# Patient Record
Sex: FEMALE | Race: WHITE | Employment: UNEMPLOYED | URBAN - METROPOLITAN AREA
[De-identification: names, ages, dates, MRNs, and addresses within clinical notes are randomized per-mention and may not be internally consistent; named-entity substitution may affect disease eponyms.]

---

## 2023-01-30 PROBLEM — L30.4 INTERTRIGO: Status: ACTIVE | Noted: 2023-01-30

## 2023-01-30 PROBLEM — G43.909 MIGRAINES: Status: ACTIVE | Noted: 2023-01-30

## 2023-01-30 PROBLEM — R19.7 DIARRHEA: Status: ACTIVE | Noted: 2023-01-30

## 2023-01-30 PROBLEM — F12.90 MARIJUANA USE: Status: ACTIVE | Noted: 2023-01-30

## 2023-01-30 PROBLEM — R73.9 HYPERGLYCEMIA: Status: ACTIVE | Noted: 2023-01-30

## 2023-01-30 PROBLEM — K21.9 GERD (GASTROESOPHAGEAL REFLUX DISEASE): Status: ACTIVE | Noted: 2023-01-30

## 2023-01-30 PROBLEM — E55.9 VITAMIN D DEFICIENCY: Status: ACTIVE | Noted: 2023-01-30

## 2023-01-30 PROBLEM — F32.2 SEVERE MAJOR DEPRESSION (MULTI): Status: ACTIVE | Noted: 2023-01-30

## 2023-01-30 PROBLEM — K31.84 GASTROPARESIS: Status: ACTIVE | Noted: 2023-01-30

## 2023-01-30 PROBLEM — R11.0 NAUSEA IN ADULT: Status: ACTIVE | Noted: 2023-01-30

## 2023-01-30 PROBLEM — E03.9 HYPOTHYROIDISM: Status: ACTIVE | Noted: 2023-01-30
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
## 2023-01-30 PROBLEM — F41.9 SEVERE ANXIETY: Status: ACTIVE | Noted: 2023-01-30

## 2023-01-30 PROBLEM — K31.9 STOMACH PROBLEMS: Status: ACTIVE | Noted: 2023-01-30

## 2023-01-30 PROBLEM — F17.210 CIGARETTE NICOTINE DEPENDENCE WITHOUT COMPLICATION: Status: ACTIVE | Noted: 2023-01-30

## 2023-01-30 PROBLEM — D80.2 IGA DEFICIENCY (MULTI): Status: ACTIVE | Noted: 2023-01-30

## 2023-01-30 PROBLEM — G47.00 INSOMNIA: Status: ACTIVE | Noted: 2023-01-30

## 2023-01-30 PROBLEM — J30.2 SEASONAL ALLERGIES: Status: ACTIVE | Noted: 2023-01-30

## 2023-01-30 PROBLEM — F43.10 PTSD (POST-TRAUMATIC STRESS DISORDER): Status: ACTIVE | Noted: 2023-01-30

## 2023-01-30 PROBLEM — S61.519A SELF-CUTTING OF WRIST (MULTI): Status: ACTIVE | Noted: 2023-01-30

## 2023-01-30 PROBLEM — E78.5 HYPERLIPIDEMIA: Status: RESOLVED | Noted: 2023-01-30 | Resolved: 2023-01-30

## 2023-01-30 RX ORDER — VARENICLINE TARTRATE 1 MG/1
1 TABLET, FILM COATED ORAL 2 TIMES DAILY
COMMUNITY
End: 2024-01-16 | Stop reason: WASHOUT

## 2023-01-30 RX ORDER — QUETIAPINE FUMARATE 200 MG/1
1 TABLET, FILM COATED ORAL DAILY
COMMUNITY
Start: 2021-06-17 | End: 2023-03-14

## 2023-01-30 RX ORDER — TRAZODONE HYDROCHLORIDE 150 MG/1
1 TABLET ORAL NIGHTLY
COMMUNITY
Start: 2021-06-17 | End: 2023-03-14

## 2023-01-30 RX ORDER — DULOXETIN HYDROCHLORIDE 60 MG/1
2 CAPSULE, DELAYED RELEASE ORAL NIGHTLY
COMMUNITY
Start: 2021-06-17 | End: 2023-03-14 | Stop reason: SDUPTHER

## 2023-01-30 RX ORDER — LEVOTHYROXINE SODIUM 100 UG/1
1 TABLET ORAL DAILY
COMMUNITY
Start: 2021-06-17 | End: 2023-03-14 | Stop reason: SDUPTHER

## 2023-01-30 RX ORDER — HYOSCYAMINE SULFATE 0.125 MG
0.12 TABLET ORAL
COMMUNITY
Start: 2021-08-11 | End: 2023-03-14 | Stop reason: SDUPTHER

## 2023-01-30 RX ORDER — ACETAMINOPHEN 500 MG
1 TABLET ORAL DAILY
COMMUNITY
Start: 2022-09-15 | End: 2023-03-14

## 2023-01-30 RX ORDER — FLUTICASONE PROPIONATE 50 MCG
1 SPRAY, SUSPENSION (ML) NASAL DAILY
COMMUNITY
Start: 2022-04-19 | End: 2023-03-14

## 2023-01-30 RX ORDER — OMEPRAZOLE 20 MG/1
20 CAPSULE, DELAYED RELEASE ORAL
COMMUNITY
Start: 2022-01-25 | End: 2023-03-14 | Stop reason: SDUPTHER

## 2023-01-30 RX ORDER — METOCLOPRAMIDE 5 MG/1
5 TABLET ORAL 4 TIMES DAILY
COMMUNITY
End: 2023-03-14 | Stop reason: SDUPTHER

## 2023-01-30 RX ORDER — CETIRIZINE HYDROCHLORIDE 10 MG/1
1 TABLET ORAL DAILY
COMMUNITY
Start: 2022-04-19 | End: 2023-03-14 | Stop reason: SDUPTHER

## 2023-02-07 PROBLEM — R69 DIAGNOSIS UNKNOWN: Status: ACTIVE | Noted: 2023-02-07

## 2023-03-14 ENCOUNTER — OFFICE VISIT (OUTPATIENT)
Dept: PRIMARY CARE | Facility: CLINIC | Age: 23
End: 2023-03-14
Payer: COMMERCIAL

## 2023-03-14 VITALS
HEART RATE: 94 BPM | DIASTOLIC BLOOD PRESSURE: 90 MMHG | WEIGHT: 293 LBS | BODY MASS INDEX: 44.41 KG/M2 | TEMPERATURE: 98.6 F | HEIGHT: 68 IN | OXYGEN SATURATION: 96 % | SYSTOLIC BLOOD PRESSURE: 122 MMHG | RESPIRATION RATE: 16 BRPM

## 2023-03-14 DIAGNOSIS — K21.9 GASTROESOPHAGEAL REFLUX DISEASE WITHOUT ESOPHAGITIS: ICD-10-CM

## 2023-03-14 DIAGNOSIS — K31.84 GASTROPARESIS: ICD-10-CM

## 2023-03-14 DIAGNOSIS — D80.2 IGA DEFICIENCY (MULTI): ICD-10-CM

## 2023-03-14 DIAGNOSIS — F32.2 SEVERE MAJOR DEPRESSION (MULTI): ICD-10-CM

## 2023-03-14 DIAGNOSIS — I10 ESSENTIAL HYPERTENSION: Primary | ICD-10-CM

## 2023-03-14 DIAGNOSIS — E03.9 ACQUIRED HYPOTHYROIDISM: ICD-10-CM

## 2023-03-14 DIAGNOSIS — E66.01 CLASS 3 SEVERE OBESITY DUE TO EXCESS CALORIES WITHOUT SERIOUS COMORBIDITY WITH BODY MASS INDEX (BMI) OF 50.0 TO 59.9 IN ADULT (MULTI): ICD-10-CM

## 2023-03-14 DIAGNOSIS — J30.2 SEASONAL ALLERGIES: ICD-10-CM

## 2023-03-14 DIAGNOSIS — F41.9 SEVERE ANXIETY: ICD-10-CM

## 2023-03-14 DIAGNOSIS — K58.0 IRRITABLE BOWEL SYNDROME WITH DIARRHEA: ICD-10-CM

## 2023-03-14 DIAGNOSIS — L30.4 INTERTRIGO: ICD-10-CM

## 2023-03-14 DIAGNOSIS — F43.10 PTSD (POST-TRAUMATIC STRESS DISORDER): ICD-10-CM

## 2023-03-14 DIAGNOSIS — R73.9 HYPERGLYCEMIA: ICD-10-CM

## 2023-03-14 PROBLEM — F51.04 PSYCHOPHYSIOLOGICAL INSOMNIA: Status: ACTIVE | Noted: 2023-01-30

## 2023-03-14 PROBLEM — R69 DIAGNOSIS UNKNOWN: Status: RESOLVED | Noted: 2023-02-07 | Resolved: 2023-03-14

## 2023-03-14 PROBLEM — R19.7 DIARRHEA: Status: RESOLVED | Noted: 2023-01-30 | Resolved: 2023-03-14

## 2023-03-14 PROBLEM — E66.813 CLASS 3 SEVERE OBESITY DUE TO EXCESS CALORIES WITHOUT SERIOUS COMORBIDITY WITH BODY MASS INDEX (BMI) OF 50.0 TO 59.9 IN ADULT: Status: ACTIVE | Noted: 2023-03-14

## 2023-03-14 LAB
POC FINGERSTICK BLOOD GLUCOSE: 119 MG/DL (ref 70–100)
POC HEMOGLOBIN A1C: 5.5 % (ref 4.2–6.5)

## 2023-03-14 PROCEDURE — 3008F BODY MASS INDEX DOCD: CPT | Performed by: FAMILY MEDICINE

## 2023-03-14 PROCEDURE — 3074F SYST BP LT 130 MM HG: CPT | Performed by: FAMILY MEDICINE

## 2023-03-14 PROCEDURE — 82962 GLUCOSE BLOOD TEST: CPT | Performed by: FAMILY MEDICINE

## 2023-03-14 PROCEDURE — 99214 OFFICE O/P EST MOD 30 MIN: CPT | Performed by: FAMILY MEDICINE

## 2023-03-14 PROCEDURE — 83036 HEMOGLOBIN GLYCOSYLATED A1C: CPT | Performed by: FAMILY MEDICINE

## 2023-03-14 PROCEDURE — 3080F DIAST BP >= 90 MM HG: CPT | Performed by: FAMILY MEDICINE

## 2023-03-14 RX ORDER — FAMOTIDINE 20 MG/1
20 TABLET, FILM COATED ORAL DAILY
Start: 2023-03-14 | End: 2024-01-16 | Stop reason: WASHOUT

## 2023-03-14 RX ORDER — HYDROXYZINE PAMOATE 25 MG/1
25 CAPSULE ORAL 4 TIMES DAILY PRN
Qty: 30 CAPSULE
Start: 2023-03-14

## 2023-03-14 RX ORDER — CETIRIZINE HYDROCHLORIDE 10 MG/1
10 TABLET ORAL DAILY
Start: 2023-03-14 | End: 2024-01-16 | Stop reason: SDUPTHER

## 2023-03-14 RX ORDER — OMEPRAZOLE 20 MG/1
20 CAPSULE, DELAYED RELEASE ORAL
Start: 2023-03-14 | End: 2024-01-16 | Stop reason: SDUPTHER

## 2023-03-14 RX ORDER — DULOXETIN HYDROCHLORIDE 60 MG/1
120 CAPSULE, DELAYED RELEASE ORAL NIGHTLY
Start: 2023-03-14 | End: 2024-01-16 | Stop reason: SDUPTHER

## 2023-03-14 RX ORDER — ONDANSETRON 4 MG/1
4 TABLET, ORALLY DISINTEGRATING ORAL EVERY 8 HOURS PRN
Qty: 20 TABLET
Start: 2023-03-14

## 2023-03-14 RX ORDER — FAMOTIDINE 20 MG/1
1 TABLET, FILM COATED ORAL DAILY
COMMUNITY
Start: 2022-08-31 | End: 2023-03-14 | Stop reason: DRUGHIGH

## 2023-03-14 RX ORDER — METOCLOPRAMIDE 5 MG/1
5 TABLET ORAL 4 TIMES DAILY
Start: 2023-03-14 | End: 2024-01-16 | Stop reason: SDUPTHER

## 2023-03-14 RX ORDER — HYDROXYZINE PAMOATE 25 MG/1
CAPSULE ORAL
COMMUNITY
Start: 2023-01-11 | End: 2023-03-14 | Stop reason: SDUPTHER

## 2023-03-14 RX ORDER — ONDANSETRON 4 MG/1
TABLET, ORALLY DISINTEGRATING ORAL
COMMUNITY
Start: 2020-02-06 | End: 2023-03-14 | Stop reason: SDUPTHER

## 2023-03-14 RX ORDER — KETOCONAZOLE 20 MG/G
CREAM TOPICAL 2 TIMES DAILY
COMMUNITY
Start: 2022-10-11 | End: 2023-03-14 | Stop reason: SDUPTHER

## 2023-03-14 RX ORDER — HYOSCYAMINE SULFATE 0.12 MG/1
TABLET, ORALLY DISINTEGRATING ORAL
COMMUNITY
Start: 2020-02-07 | End: 2023-03-14 | Stop reason: SDUPTHER

## 2023-03-14 RX ORDER — LEVOTHYROXINE SODIUM 100 UG/1
100 TABLET ORAL
Start: 2023-03-14 | End: 2023-05-08

## 2023-03-14 RX ORDER — FAMOTIDINE 20 MG/1
20 TABLET, FILM COATED ORAL DAILY
Status: SHIPPED
Start: 2023-03-14 | End: 2023-03-14 | Stop reason: SDUPTHER

## 2023-03-14 RX ORDER — LISINOPRIL 10 MG/1
10 TABLET ORAL DAILY
Qty: 30 TABLET | Refills: 5 | Status: SHIPPED | OUTPATIENT
Start: 2023-03-14 | End: 2023-03-29 | Stop reason: SDUPTHER

## 2023-03-14 RX ORDER — HYOSCYAMINE SULFATE 0.12 MG/1
0.12 TABLET, ORALLY DISINTEGRATING ORAL 4 TIMES DAILY PRN
Qty: 360 TABLET | Refills: 1
Start: 2023-03-14 | End: 2023-12-14 | Stop reason: SDUPTHER

## 2023-03-14 RX ORDER — KETOCONAZOLE 20 MG/G
CREAM TOPICAL 2 TIMES DAILY
Start: 2023-03-14 | End: 2024-01-16 | Stop reason: WASHOUT

## 2023-03-14 ASSESSMENT — ENCOUNTER SYMPTOMS
POLYDIPSIA: 0
NUMBNESS: 0
CONFUSION: 0
COUGH: 0
DYSPHORIC MOOD: 0
LIGHT-HEADEDNESS: 0
DIZZINESS: 0
ABDOMINAL PAIN: 0
SINUS PAIN: 0
SHORTNESS OF BREATH: 0
HEMATURIA: 0
NAUSEA: 0
HEADACHES: 0
POLYPHAGIA: 0
ADENOPATHY: 0
CHILLS: 0
WHEEZING: 0
NERVOUS/ANXIOUS: 0
DYSURIA: 0
FEVER: 0
DIARRHEA: 0
CONSTIPATION: 0
DIAPHORESIS: 0
FREQUENCY: 0
SORE THROAT: 0
VOMITING: 0
CHEST TIGHTNESS: 0
UNEXPECTED WEIGHT CHANGE: 0
SINUS PRESSURE: 0
PALPITATIONS: 0

## 2023-03-14 ASSESSMENT — PATIENT HEALTH QUESTIONNAIRE - PHQ9
6. FEELING BAD ABOUT YOURSELF - OR THAT YOU ARE A FAILURE OR HAVE LET YOURSELF OR YOUR FAMILY DOWN: NEARLY EVERY DAY
3. TROUBLE FALLING OR STAYING ASLEEP OR SLEEPING TOO MUCH: MORE THAN HALF THE DAYS
5. POOR APPETITE OR OVEREATING: NEARLY EVERY DAY
4. FEELING TIRED OR HAVING LITTLE ENERGY: NEARLY EVERY DAY
10. IF YOU CHECKED OFF ANY PROBLEMS, HOW DIFFICULT HAVE THESE PROBLEMS MADE IT FOR YOU TO DO YOUR WORK, TAKE CARE OF THINGS AT HOME, OR GET ALONG WITH OTHER PEOPLE: EXTREMELY DIFFICULT
SUM OF ALL RESPONSES TO PHQ QUESTIONS 1-9: 24
SUM OF ALL RESPONSES TO PHQ9 QUESTIONS 1 AND 2: 6
2. FEELING DOWN, DEPRESSED OR HOPELESS: NEARLY EVERY DAY
1. LITTLE INTEREST OR PLEASURE IN DOING THINGS: NEARLY EVERY DAY
7. TROUBLE CONCENTRATING ON THINGS, SUCH AS READING THE NEWSPAPER OR WATCHING TELEVISION: NEARLY EVERY DAY
9. THOUGHTS THAT YOU WOULD BE BETTER OFF DEAD, OR OF HURTING YOURSELF: NEARLY EVERY DAY
8. MOVING OR SPEAKING SO SLOWLY THAT OTHER PEOPLE COULD HAVE NOTICED. OR THE OPPOSITE, BEING SO FIGETY OR RESTLESS THAT YOU HAVE BEEN MOVING AROUND A LOT MORE THAN USUAL: SEVERAL DAYS

## 2023-03-14 ASSESSMENT — ANXIETY QUESTIONNAIRES
2. NOT BEING ABLE TO STOP OR CONTROL WORRYING: NEARLY EVERY DAY
4. TROUBLE RELAXING: SEVERAL DAYS
5. BEING SO RESTLESS THAT IT IS HARD TO SIT STILL: NEARLY EVERY DAY
1. FEELING NERVOUS, ANXIOUS, OR ON EDGE: NEARLY EVERY DAY
3. WORRYING TOO MUCH ABOUT DIFFERENT THINGS: NEARLY EVERY DAY
IF YOU CHECKED OFF ANY PROBLEMS ON THIS QUESTIONNAIRE, HOW DIFFICULT HAVE THESE PROBLEMS MADE IT FOR YOU TO DO YOUR WORK, TAKE CARE OF THINGS AT HOME, OR GET ALONG WITH OTHER PEOPLE: EXTREMELY DIFFICULT
7. FEELING AFRAID AS IF SOMETHING AWFUL MIGHT HAPPEN: NEARLY EVERY DAY
6. BECOMING EASILY ANNOYED OR IRRITABLE: NEARLY EVERY DAY
GAD7 TOTAL SCORE: 19

## 2023-03-14 NOTE — ASSESSMENT & PLAN NOTE
Encouraged healthy lifestyle, including adequate exercise and high fiber, low fat and low carb diet.

## 2023-03-14 NOTE — PATIENT INSTRUCTIONS
Isabella Tran ,    Thank you for coming in today. We at [facility] appreciate your trust in our care. If you have any questions or concerns about the care you received today, please do not hesitate to contact us at [phone].    The following instructions were discussed today:    - start lisinopril 10 mg daily   - get labs  - For blood work: Nothing to eat or drink for at least 10 hours prior. Okay for water or black coffee.   - Follow up in 2 weeks for nurse visit for blood pressure   - Follow up in 3 months.

## 2023-03-14 NOTE — PROGRESS NOTES
"Subjective   Patient ID: Isabella Tran is a 22 y.o. adult who presents for bp and sugar concerns.    routine follow up. chronic issues as per assessment and plan.     Concerned about blood pressure. Has been monitoring at home with a wrist cuff --> running 150s to 160s over 90s to low 100s. Today initial blood pressure 148/96 and on recheck 122/90. Denies chest pain and shortness of breath.     His mom is also concerned about his blood sugar. Diabetes does run in the family. Blood sugar today was 119. Had [employer]a-[employer]a about an hour prior. HbA1c today is 5.5.         Review of Systems   Constitutional:  Negative for chills, diaphoresis, fever and unexpected weight change.   HENT:  Negative for congestion, sinus pressure, sinus pain, sneezing and sore throat.    Respiratory:  Negative for cough, chest tightness, shortness of breath and wheezing.    Cardiovascular:  Negative for chest pain, palpitations and leg swelling.   Gastrointestinal:  Negative for abdominal pain, constipation, diarrhea, nausea and vomiting.   Endocrine: Negative for cold intolerance, heat intolerance, polydipsia, polyphagia and polyuria.   Genitourinary:  Negative for dysuria, frequency, hematuria and urgency.   Neurological:  Negative for dizziness, syncope, light-headedness, numbness and headaches.   Hematological:  Negative for adenopathy.   Psychiatric/Behavioral:  Negative for confusion and dysphoric mood. The patient is not nervous/anxious.        Objective   /90   Pulse 94   Temp 37 °C (98.6 °F) (Oral)   Resp 16   Ht 1.727 m (5' 8\")   Wt (!) 168 kg (371 lb)   SpO2 96%   BMI 56.41 kg/m²     Physical Exam  Vitals and nursing note reviewed.   Constitutional:       General: He is not in acute distress.     Appearance: Normal appearance.   HENT:      Head: Normocephalic and atraumatic.      Nose: Nose normal.   Eyes:      Extraocular Movements: Extraocular movements intact.      Conjunctiva/sclera: Conjunctivae normal.      " Pupils: Pupils are equal, round, and reactive to light.   Cardiovascular:      Rate and Rhythm: Normal rate and regular rhythm.      Heart sounds: No murmur heard.     No friction rub. No gallop.   Pulmonary:      Effort: Pulmonary effort is normal.      Breath sounds: Normal breath sounds. No wheezing, rhonchi or rales.   Abdominal:      General: Bowel sounds are normal. There is no distension.      Palpations: Abdomen is soft.      Tenderness: There is no abdominal tenderness.   Musculoskeletal:         General: Normal range of motion.      Cervical back: Normal range of motion and neck supple.   Skin:     General: Skin is warm and dry.   Neurological:      General: No focal deficit present.      Mental Status: He is alert and oriented to person, place, and time.      Deep Tendon Reflexes: Reflexes normal.   Psychiatric:         Mood and Affect: Mood normal.         Behavior: Behavior normal.         Thought Content: Thought content normal.         Judgment: Judgment normal.       Lab Results   Component Value Date    HGBA1C 5.5 03/14/2023        Assessment/Plan   Problem List Items Addressed This Visit       BMI 50.0-59.9, adult (CMS/[facility])     Encouraged healthy lifestyle, including adequate exercise and high fiber, low fat and low carb diet.          Class 3 severe obesity due to excess calories without serious comorbidity with body mass index (BMI) of 50.0 to 59.9 in adult (CMS/[facility])     Encouraged healthy lifestyle, including adequate exercise and high fiber, low fat and low carb diet.          Relevant Orders    CBC and Auto Differential    Comprehensive Metabolic Panel    Lipid Panel    TSH with reflex to Free T4 if abnormal    Vitamin B12    Vitamin D 1,25 Dihydroxy    Essential hypertension - Primary    Relevant Medications    lisinopril 10 mg tablet    Gastroesophageal reflux disease without esophagitis     - stable. Continue omeprazole and pepcid         Relevant Medications    famotidine (Pepcid) 20 mg  tablet    omeprazole (PriLOSEC) 20 mg DR capsule    ondansetron ODT (Zofran-ODT) 4 mg disintegrating tablet    Other Relevant Orders    CBC and Auto Differential    Comprehensive Metabolic Panel    Lipid Panel    TSH with reflex to Free T4 if abnormal    Vitamin B12    Vitamin D 1,25 Dihydroxy    Gastroparesis     - stable. Continue metoclopramide          Relevant Medications    metoclopramide (Reglan) 5 mg tablet    ondansetron ODT (Zofran-ODT) 4 mg disintegrating tablet    Other Relevant Orders    CBC and Auto Differential    Comprehensive Metabolic Panel    Lipid Panel    TSH with reflex to Free T4 if abnormal    Vitamin B12    Vitamin D 1,25 Dihydroxy    Hyperglycemia     - blood sugar today 119  - HbA1c today 5.5, so no diabetes  - Encouraged healthy lifestyle, including adequate exercise and high fiber, low fat and low carb diet.          Relevant Orders    POCT fingerstick glucose manually resulted (Completed)    CBC and Auto Differential    Comprehensive Metabolic Panel    Lipid Panel    TSH with reflex to Free T4 if abnormal    Vitamin B12    Vitamin D 1,25 Dihydroxy    POCT glycosylated hemoglobin (Hb A1C) manually resulted (Completed)    Hypothyroidism     - continue levothyroxine  - check labs          Relevant Medications    levothyroxine (Synthroid, Levoxyl) 100 mcg tablet    Other Relevant Orders    CBC and Auto Differential    Comprehensive Metabolic Panel    Lipid Panel    TSH with reflex to Free T4 if abnormal    Vitamin B12    Vitamin D 1,25 Dihydroxy    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  - primarily manifests as stomach issues  -monitor         Intertrigo    Relevant Medications    ketoconazole (NIZOral) 2 % cream    Other Relevant Orders    CBC and Auto Differential    Comprehensive Metabolic Panel    Lipid Panel    TSH with reflex to Free T4 if abnormal    Vitamin B12    Vitamin D 1,25 Dihydroxy    Irritable bowel syndrome with diarrhea     - continue levsin         Relevant  Medications    hyoscyamine (Anaspaz) 0.125 mg disintegrating tablet    ondansetron ODT (Zofran-ODT) 4 mg disintegrating tablet    Other Relevant Orders    CBC and Auto Differential    Comprehensive Metabolic Panel    Lipid Panel    TSH with reflex to Free T4 if abnormal    Vitamin B12    Vitamin D 1,25 Dihydroxy    PTSD (post-traumatic stress disorder)     - follows with psychiatry          Relevant Medications    DULoxetine (Cymbalta) 60 mg DR capsule    hydrOXYzine pamoate (Vistaril) 25 mg capsule    Other Relevant Orders    CBC and Auto Differential    Comprehensive Metabolic Panel    Lipid Panel    TSH with reflex to Free T4 if abnormal    Vitamin B12    Vitamin D 1,25 Dihydroxy    Seasonal allergies     - controlled. Continue cetirizine         Relevant Medications    cetirizine (ZyrTEC) 10 mg tablet    Other Relevant Orders    CBC and Auto Differential    Comprehensive Metabolic Panel    Lipid Panel    TSH with reflex to Free T4 if abnormal    Vitamin B12    Vitamin D 1,25 Dihydroxy    Severe anxiety     - follows with psychiatry          Relevant Medications    DULoxetine (Cymbalta) 60 mg DR capsule    hydrOXYzine pamoate (Vistaril) 25 mg capsule    Other Relevant Orders    CBC and Auto Differential    Comprehensive Metabolic Panel    Lipid Panel    TSH with reflex to Free T4 if abnormal    Vitamin B12    Vitamin D 1,25 Dihydroxy    Severe major depression (CMS/HCC)     - follows with psychiatry         Relevant Medications    DULoxetine (Cymbalta) 60 mg DR capsule    hydrOXYzine pamoate (Vistaril) 25 mg capsule    Other Relevant Orders    CBC and Auto Differential    Comprehensive Metabolic Panel    Lipid Panel    TSH with reflex to Free T4 if abnormal    Vitamin B12    Vitamin D 1,25 Dihydroxy

## 2023-03-14 NOTE — ASSESSMENT & PLAN NOTE
- blood sugar today 119  - HbA1c today 5.5, so no diabetes  - Encouraged healthy lifestyle, including adequate exercise and high fiber, low fat and low carb diet.

## 2023-03-29 ENCOUNTER — CLINICAL SUPPORT (OUTPATIENT)
Dept: PRIMARY CARE | Facility: CLINIC | Age: 23
End: 2023-03-29
Payer: COMMERCIAL

## 2023-03-29 VITALS — SYSTOLIC BLOOD PRESSURE: 142 MMHG | DIASTOLIC BLOOD PRESSURE: 98 MMHG

## 2023-03-29 DIAGNOSIS — I10 ESSENTIAL HYPERTENSION: ICD-10-CM

## 2023-03-29 PROCEDURE — 99211 OFF/OP EST MAY X REQ PHY/QHP: CPT | Performed by: FAMILY MEDICINE

## 2023-03-29 RX ORDER — LISINOPRIL 20 MG/1
20 TABLET ORAL DAILY
Qty: 30 TABLET | Refills: 5 | Status: SHIPPED | OUTPATIENT
Start: 2023-03-29 | End: 2023-04-25

## 2023-03-30 ENCOUNTER — TELEPHONE (OUTPATIENT)
Dept: PRIMARY CARE | Facility: CLINIC | Age: 23
End: 2023-03-30
Payer: COMMERCIAL

## 2023-03-30 NOTE — TELEPHONE ENCOUNTER
----- Message from Karen Ventura RN sent at 3/30/2023  1:35 PM EDT -----  LET PT KNOW   ----- Message -----  From: Michelle Pastrana MD  Sent: 3/29/2023   8:18 PM EDT  To: Chiqui Pereira MA

## 2023-03-30 NOTE — TELEPHONE ENCOUNTER
Left detailed message on pt voice mail, explained to start lisinopril 20 mg daily and to get labs that Dr price ordered

## 2023-04-04 DIAGNOSIS — J30.2 OTHER SEASONAL ALLERGIC RHINITIS: ICD-10-CM

## 2023-04-04 RX ORDER — FLUTICASONE PROPIONATE 50 MCG
SPRAY, SUSPENSION (ML) NASAL
Qty: 16 ML | Refills: 2 | Status: SHIPPED | OUTPATIENT
Start: 2023-04-04 | End: 2024-01-16 | Stop reason: WASHOUT

## 2023-04-23 DIAGNOSIS — I10 ESSENTIAL HYPERTENSION: ICD-10-CM

## 2023-04-25 RX ORDER — LISINOPRIL 20 MG/1
TABLET ORAL
Qty: 30 TABLET | Refills: 5 | Status: SHIPPED | OUTPATIENT
Start: 2023-04-25 | End: 2023-05-31 | Stop reason: SINTOL

## 2023-05-08 DIAGNOSIS — E03.9 ACQUIRED HYPOTHYROIDISM: ICD-10-CM

## 2023-05-08 RX ORDER — LEVOTHYROXINE SODIUM 100 UG/1
TABLET ORAL
Qty: 90 TABLET | Refills: 1 | Status: SHIPPED | OUTPATIENT
Start: 2023-05-08 | End: 2023-10-31 | Stop reason: SDUPTHER

## 2023-05-26 ENCOUNTER — TELEPHONE (OUTPATIENT)
Dept: PRIMARY CARE | Facility: CLINIC | Age: 23
End: 2023-05-26
Payer: COMMERCIAL

## 2023-05-26 DIAGNOSIS — I10 ESSENTIAL HYPERTENSION: Primary | ICD-10-CM

## 2023-05-26 NOTE — TELEPHONE ENCOUNTER
Pt is on lisinopril and has the side effect of the coughing. The cough is so bad that he coughed so hard that he passed out and hit his head on the table.  He has no other symptoms, no runny nose, no fever, no congestion, just a very dry cough, drinking lots of water to try to get rid of the dry cough.  Mom is making him stop the medication, which I advised would be the right thing to do and that I would notify you of this.  He is still sleeping at this time, advised mom to keep in eye on him in regards to him hitting his head last night.  He seemed fine after wards.  Just a headache, no vomiting or nausea last night.  Told her to watch for symptoms today. If he has any nausea, vomiting, or blurred vision, to go to

## 2023-05-26 NOTE — TELEPHONE ENCOUNTER
Bad cough from lisinopril, coughed so hard, he passed out and hit head.  No runny nose, no congestion, no fever. Headache last night but no vomiting or nausea from hitting his head.  Not sure how he feels today since he is still sleeping.  Told mom to look for symptoms of vomiting, nausea, dizziness or blurred vision.  Take him to ER if he has any of those symptoms meaning he could have a concussion.  Advised her to have him stop the lisinopril until she hears back from our office or [facility] ( if you call something new in for him) [facility]

## 2023-05-31 RX ORDER — LOSARTAN POTASSIUM 25 MG/1
25 TABLET ORAL DAILY
Qty: 30 TABLET | Refills: 1 | Status: SHIPPED | OUTPATIENT
Start: 2023-05-31 | End: 2023-06-14 | Stop reason: SDUPTHER

## 2023-06-14 ENCOUNTER — OFFICE VISIT (OUTPATIENT)
Dept: PRIMARY CARE | Facility: CLINIC | Age: 23
End: 2023-06-14
Payer: COMMERCIAL

## 2023-06-14 VITALS
HEIGHT: 68 IN | WEIGHT: 293 LBS | SYSTOLIC BLOOD PRESSURE: 126 MMHG | OXYGEN SATURATION: 95 % | HEART RATE: 93 BPM | BODY MASS INDEX: 44.41 KG/M2 | RESPIRATION RATE: 18 BRPM | DIASTOLIC BLOOD PRESSURE: 82 MMHG

## 2023-06-14 DIAGNOSIS — I10 ESSENTIAL HYPERTENSION: ICD-10-CM

## 2023-06-14 DIAGNOSIS — F51.04 PSYCHOPHYSIOLOGICAL INSOMNIA: ICD-10-CM

## 2023-06-14 DIAGNOSIS — K31.84 GASTROPARESIS: ICD-10-CM

## 2023-06-14 DIAGNOSIS — F32.2 SEVERE MAJOR DEPRESSION (MULTI): ICD-10-CM

## 2023-06-14 DIAGNOSIS — E03.9 ADULT HYPOTHYROIDISM: Primary | ICD-10-CM

## 2023-06-14 DIAGNOSIS — F43.10 PTSD (POST-TRAUMATIC STRESS DISORDER): ICD-10-CM

## 2023-06-14 DIAGNOSIS — F41.9 SEVERE ANXIETY: ICD-10-CM

## 2023-06-14 DIAGNOSIS — D80.2 IGA DEFICIENCY (MULTI): ICD-10-CM

## 2023-06-14 DIAGNOSIS — K58.0 IRRITABLE BOWEL SYNDROME WITH DIARRHEA: ICD-10-CM

## 2023-06-14 DIAGNOSIS — E55.9 VITAMIN D DEFICIENCY: ICD-10-CM

## 2023-06-14 DIAGNOSIS — Z13.220 LIPID SCREENING: ICD-10-CM

## 2023-06-14 DIAGNOSIS — K21.9 GASTROESOPHAGEAL REFLUX DISEASE WITHOUT ESOPHAGITIS: ICD-10-CM

## 2023-06-14 DIAGNOSIS — J30.2 SEASONAL ALLERGIES: ICD-10-CM

## 2023-06-14 PROBLEM — R73.9 HYPERGLYCEMIA: Status: RESOLVED | Noted: 2023-01-30 | Resolved: 2023-06-14

## 2023-06-14 PROBLEM — K31.9 STOMACH PROBLEMS: Status: RESOLVED | Noted: 2023-01-30 | Resolved: 2023-06-14

## 2023-06-14 PROBLEM — G43.009 MIGRAINE WITHOUT AURA AND WITHOUT STATUS MIGRAINOSUS, NOT INTRACTABLE: Status: ACTIVE | Noted: 2023-01-30

## 2023-06-14 PROCEDURE — 3079F DIAST BP 80-89 MM HG: CPT | Performed by: FAMILY MEDICINE

## 2023-06-14 PROCEDURE — 99214 OFFICE O/P EST MOD 30 MIN: CPT | Performed by: FAMILY MEDICINE

## 2023-06-14 PROCEDURE — 3008F BODY MASS INDEX DOCD: CPT | Performed by: FAMILY MEDICINE

## 2023-06-14 PROCEDURE — 3074F SYST BP LT 130 MM HG: CPT | Performed by: FAMILY MEDICINE

## 2023-06-14 RX ORDER — LOSARTAN POTASSIUM 25 MG/1
25 TABLET ORAL DAILY
Qty: 90 TABLET | Refills: 1 | Status: SHIPPED | OUTPATIENT
Start: 2023-06-14 | End: 2024-01-16 | Stop reason: SDUPTHER

## 2023-06-14 ASSESSMENT — ENCOUNTER SYMPTOMS
DIZZINESS: 0
SORE THROAT: 0
COUGH: 0
ADENOPATHY: 0
DYSURIA: 0
UNEXPECTED WEIGHT CHANGE: 0
POLYPHAGIA: 0
DIAPHORESIS: 0
PALPITATIONS: 0
VOMITING: 0
NUMBNESS: 0
CONSTIPATION: 0
HEMATURIA: 0
ABDOMINAL PAIN: 0
CONFUSION: 0
DIARRHEA: 0
NAUSEA: 0
SINUS PRESSURE: 0
DYSPHORIC MOOD: 0
HEADACHES: 0
CHILLS: 0
NERVOUS/ANXIOUS: 0
WHEEZING: 0
SHORTNESS OF BREATH: 0
LIGHT-HEADEDNESS: 0
CHEST TIGHTNESS: 0
SINUS PAIN: 0
POLYDIPSIA: 0
FEVER: 0
FREQUENCY: 0

## 2023-06-14 NOTE — PROGRESS NOTES
"Subjective   Patient ID: Isabella Tran is a 22 y.o. adult who presents for Follow-up.    routine follow up. chronic issues as per assessment and plan.          Review of Systems   Constitutional:  Negative for chills, diaphoresis, fever and unexpected weight change.   HENT:  Negative for congestion, sinus pressure, sinus pain, sneezing and sore throat.    Respiratory:  Negative for cough, chest tightness, shortness of breath and wheezing.    Cardiovascular:  Negative for chest pain, palpitations and leg swelling.   Gastrointestinal:  Negative for abdominal pain, constipation, diarrhea, nausea and vomiting.   Endocrine: Negative for cold intolerance, heat intolerance, polydipsia, polyphagia and polyuria.   Genitourinary:  Negative for dysuria, frequency, hematuria and urgency.   Neurological:  Negative for dizziness, syncope, light-headedness, numbness and headaches.   Hematological:  Negative for adenopathy.   Psychiatric/Behavioral:  Negative for confusion and dysphoric mood. The patient is not nervous/anxious.        Objective   /82   Pulse 93   Resp 18   Ht 1.727 m (5' 8\")   Wt (!) 164 kg (362 lb)   SpO2 95%   BMI 55.04 kg/m²     Physical Exam  Vitals and nursing note reviewed.   Constitutional:       General: He is not in acute distress.     Appearance: Normal appearance.   HENT:      Head: Normocephalic and atraumatic.      Nose: Nose normal.   Eyes:      Extraocular Movements: Extraocular movements intact.      Conjunctiva/sclera: Conjunctivae normal.      Pupils: Pupils are equal, round, and reactive to light.   Cardiovascular:      Rate and Rhythm: Normal rate and regular rhythm.      Heart sounds: No murmur heard.     No friction rub. No gallop.   Pulmonary:      Effort: Pulmonary effort is normal.      Breath sounds: Normal breath sounds. No wheezing, rhonchi or rales.   Abdominal:      General: Bowel sounds are normal. There is no distension.      Palpations: Abdomen is soft.      " Tenderness: There is no abdominal tenderness.   Musculoskeletal:         General: Normal range of motion.      Cervical back: Normal range of motion and neck supple.   Skin:     General: Skin is warm and dry.   Neurological:      General: No focal deficit present.      Mental Status: He is alert and oriented to person, place, and time.      Deep Tendon Reflexes: Reflexes normal.   Psychiatric:         Mood and Affect: Mood normal.         Behavior: Behavior normal.         Thought Content: Thought content normal.         Judgment: Judgment normal.         Assessment/Plan   Problem List Items Addressed This Visit       Adult hypothyroidism - Primary     - continue levothyroxine  - check labs          Relevant Orders    Vitamin B12    CBC and Auto Differential    Comprehensive Metabolic Panel    TSH with reflex to Free T4 if abnormal    Essential hypertension     - improved with the addition of losartan. Continue          Relevant Medications    losartan (Cozaar) 25 mg tablet    Other Relevant Orders    Vitamin B12    CBC and Auto Differential    Comprehensive Metabolic Panel    Gastroesophageal reflux disease without esophagitis     - stable. Continue omeprazole and pepcid         Relevant Orders    Vitamin B12    CBC and Auto Differential    Comprehensive Metabolic Panel    Gastroparesis     - stable. 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 Continue trazodone         Relevant Orders    Vitamin B12    CBC and Auto  Differential    Comprehensive Metabolic Panel    PTSD (post-traumatic stress disorder)     - follows with psychiatry          Relevant Orders    Vitamin B12    CBC and Auto Differential    Comprehensive Metabolic Panel    Seasonal allergies     - controlled. Continue cetirizine         Relevant Orders    Vitamin B12    CBC and Auto Differential    Comprehensive Metabolic Panel    Severe anxiety     - follows with psychiatry          Relevant Orders    Vitamin B12    CBC and Auto Differential    Comprehensive Metabolic Panel    Severe major depression (CMS/HCC)     - follows with psychiatry         Relevant Orders    Vitamin B12    CBC and Auto Differential    Comprehensive Metabolic Panel    Vitamin D deficiency     - check labs          Relevant Orders    Vitamin B12    Vitamin D, Total    CBC and Auto Differential    Comprehensive Metabolic Panel     Other Visit Diagnoses       Lipid screening        Relevant Orders    Lipid Panel

## 2023-06-14 NOTE — PATIENT INSTRUCTIONS
Isabella Tran ,    Thank you for coming in today. We at [facility] appreciate your trust in our care. If you have any questions or concerns about the care you received today, please do not hesitate to contact us at [phone].    The following instructions were discussed today:    - get labs  - For blood work: Nothing to eat or drink for at least 10 hours prior. Okay for water or black coffee.

## 2023-07-21 DIAGNOSIS — F32.2 MAJOR DEPRESSIVE DISORDER, SINGLE EPISODE, SEVERE WITHOUT PSYCHOTIC FEATURES (MULTI): ICD-10-CM

## 2023-07-21 RX ORDER — QUETIAPINE FUMARATE 200 MG/1
200 TABLET, FILM COATED ORAL DAILY
Qty: 90 TABLET | Refills: 3 | Status: SHIPPED | OUTPATIENT
Start: 2023-07-21 | End: 2024-01-16 | Stop reason: WASHOUT

## 2023-10-31 DIAGNOSIS — E03.9 ACQUIRED HYPOTHYROIDISM: ICD-10-CM

## 2023-10-31 RX ORDER — LEVOTHYROXINE SODIUM 100 UG/1
TABLET ORAL
Qty: 90 TABLET | Refills: 1 | Status: SHIPPED | OUTPATIENT
Start: 2023-10-31 | End: 2024-01-16 | Stop reason: SDUPTHER

## 2023-12-14 ENCOUNTER — APPOINTMENT (OUTPATIENT)
Dept: PRIMARY CARE | Facility: CLINIC | Age: 23
End: 2023-12-14
Payer: COMMERCIAL

## 2023-12-14 DIAGNOSIS — K58.0 IRRITABLE BOWEL SYNDROME WITH DIARRHEA: ICD-10-CM

## 2023-12-14 RX ORDER — HYOSCYAMINE SULFATE 0.12 MG/1
0.12 TABLET, ORALLY DISINTEGRATING ORAL 4 TIMES DAILY PRN
Qty: 360 TABLET | Refills: 1 | Status: SHIPPED | OUTPATIENT
Start: 2023-12-14 | End: 2024-06-11

## 2023-12-14 NOTE — PROGRESS NOTES
Subjective   Patient ID: Harris Tran is a 23 y.o. adult who presents for No chief complaint on file..    HPI     routine follow up. chronic issues as per assessment and plan.     Review of Systems    Objective   There were no vitals taken for this visit.    Physical Exam    Assessment/Plan   {Assess/PlanSmartLinks:[id number]}       
(0) indicator not present

## 2024-01-01 DIAGNOSIS — I10 ESSENTIAL HYPERTENSION: ICD-10-CM

## 2024-01-02 RX ORDER — LOSARTAN POTASSIUM 25 MG/1
25 TABLET ORAL DAILY
Qty: 90 TABLET | Refills: 1 | OUTPATIENT
Start: 2024-01-02

## 2024-01-16 ENCOUNTER — OFFICE VISIT (OUTPATIENT)
Dept: PRIMARY CARE | Facility: CLINIC | Age: 24
End: 2024-01-16
Payer: COMMERCIAL

## 2024-01-16 VITALS
SYSTOLIC BLOOD PRESSURE: 109 MMHG | BODY MASS INDEX: 44.41 KG/M2 | WEIGHT: 293 LBS | TEMPERATURE: 99 F | RESPIRATION RATE: 16 BRPM | OXYGEN SATURATION: 97 % | HEART RATE: 97 BPM | HEIGHT: 68 IN | DIASTOLIC BLOOD PRESSURE: 71 MMHG

## 2024-01-16 DIAGNOSIS — K58.0 IRRITABLE BOWEL SYNDROME WITH DIARRHEA: ICD-10-CM

## 2024-01-16 DIAGNOSIS — E66.01 CLASS 3 SEVERE OBESITY DUE TO EXCESS CALORIES WITHOUT SERIOUS COMORBIDITY WITH BODY MASS INDEX (BMI) OF 50.0 TO 59.9 IN ADULT (MULTI): ICD-10-CM

## 2024-01-16 DIAGNOSIS — Z23 ENCOUNTER FOR IMMUNIZATION: ICD-10-CM

## 2024-01-16 DIAGNOSIS — Z11.59 NEED FOR HEPATITIS C SCREENING TEST: ICD-10-CM

## 2024-01-16 DIAGNOSIS — F32.2 SEVERE MAJOR DEPRESSION (MULTI): ICD-10-CM

## 2024-01-16 DIAGNOSIS — F43.10 PTSD (POST-TRAUMATIC STRESS DISORDER): ICD-10-CM

## 2024-01-16 DIAGNOSIS — E03.9 ADULT HYPOTHYROIDISM: Primary | ICD-10-CM

## 2024-01-16 DIAGNOSIS — F41.9 SEVERE ANXIETY: ICD-10-CM

## 2024-01-16 DIAGNOSIS — Z11.4 ENCOUNTER FOR SCREENING FOR HIV: ICD-10-CM

## 2024-01-16 DIAGNOSIS — I10 ESSENTIAL HYPERTENSION: ICD-10-CM

## 2024-01-16 DIAGNOSIS — K31.84 GASTROPARESIS: ICD-10-CM

## 2024-01-16 DIAGNOSIS — F51.04 PSYCHOPHYSIOLOGICAL INSOMNIA: ICD-10-CM

## 2024-01-16 DIAGNOSIS — E03.9 ACQUIRED HYPOTHYROIDISM: ICD-10-CM

## 2024-01-16 DIAGNOSIS — F17.210 CIGARETTE NICOTINE DEPENDENCE WITHOUT COMPLICATION: ICD-10-CM

## 2024-01-16 DIAGNOSIS — Z13.220 LIPID SCREENING: ICD-10-CM

## 2024-01-16 DIAGNOSIS — E55.9 VITAMIN D DEFICIENCY: ICD-10-CM

## 2024-01-16 DIAGNOSIS — D80.2 IGA DEFICIENCY (MULTI): ICD-10-CM

## 2024-01-16 DIAGNOSIS — J30.2 SEASONAL ALLERGIES: ICD-10-CM

## 2024-01-16 DIAGNOSIS — K21.9 GASTROESOPHAGEAL REFLUX DISEASE WITHOUT ESOPHAGITIS: ICD-10-CM

## 2024-01-16 PROCEDURE — 90686 IIV4 VACC NO PRSV 0.5 ML IM: CPT | Performed by: FAMILY MEDICINE

## 2024-01-16 PROCEDURE — 4004F PT TOBACCO SCREEN RCVD TLK: CPT | Performed by: FAMILY MEDICINE

## 2024-01-16 PROCEDURE — 3008F BODY MASS INDEX DOCD: CPT | Performed by: FAMILY MEDICINE

## 2024-01-16 PROCEDURE — 90471 IMMUNIZATION ADMIN: CPT | Performed by: FAMILY MEDICINE

## 2024-01-16 PROCEDURE — 3078F DIAST BP <80 MM HG: CPT | Performed by: FAMILY MEDICINE

## 2024-01-16 PROCEDURE — 3074F SYST BP LT 130 MM HG: CPT | Performed by: FAMILY MEDICINE

## 2024-01-16 PROCEDURE — 99214 OFFICE O/P EST MOD 30 MIN: CPT | Performed by: FAMILY MEDICINE

## 2024-01-16 RX ORDER — METOCLOPRAMIDE 5 MG/1
5 TABLET ORAL 4 TIMES DAILY
Qty: 360 TABLET | Refills: 1 | Status: SHIPPED | OUTPATIENT
Start: 2024-01-16

## 2024-01-16 RX ORDER — CETIRIZINE HYDROCHLORIDE 10 MG/1
10 TABLET ORAL DAILY
Qty: 90 TABLET | Refills: 1 | Status: SHIPPED | OUTPATIENT
Start: 2024-01-16

## 2024-01-16 RX ORDER — LEVOTHYROXINE SODIUM 100 UG/1
100 TABLET ORAL DAILY
Qty: 90 TABLET | Refills: 1 | Status: SHIPPED | OUTPATIENT
Start: 2024-01-16 | End: 2024-07-14

## 2024-01-16 RX ORDER — OMEPRAZOLE 20 MG/1
20 CAPSULE, DELAYED RELEASE ORAL
Qty: 90 CAPSULE | Refills: 1 | Status: SHIPPED | OUTPATIENT
Start: 2024-01-16

## 2024-01-16 RX ORDER — LOSARTAN POTASSIUM 25 MG/1
25 TABLET ORAL DAILY
Qty: 90 TABLET | Refills: 1 | Status: SHIPPED | OUTPATIENT
Start: 2024-01-16 | End: 2024-07-14

## 2024-01-16 RX ORDER — DULOXETIN HYDROCHLORIDE 60 MG/1
120 CAPSULE, DELAYED RELEASE ORAL NIGHTLY
Qty: 180 CAPSULE | Refills: 1 | Status: SHIPPED | OUTPATIENT
Start: 2024-01-16

## 2024-01-16 ASSESSMENT — ENCOUNTER SYMPTOMS
FEVER: 0
DIZZINESS: 0
DIARRHEA: 0
HEADACHES: 0
FREQUENCY: 0
HEMATURIA: 0
SHORTNESS OF BREATH: 0
WHEEZING: 0
DYSPHORIC MOOD: 0
COUGH: 0
POLYDIPSIA: 0
POLYPHAGIA: 0
CONFUSION: 0
DYSURIA: 0
ADENOPATHY: 0
CHEST TIGHTNESS: 0
CONSTIPATION: 0
CHILLS: 0
VOMITING: 0
ABDOMINAL PAIN: 0
SORE THROAT: 0
NUMBNESS: 0
SINUS PRESSURE: 0
NAUSEA: 0
SINUS PAIN: 0
PALPITATIONS: 0
LIGHT-HEADEDNESS: 0
DIAPHORESIS: 0
NERVOUS/ANXIOUS: 0
UNEXPECTED WEIGHT CHANGE: 0

## 2024-01-16 ASSESSMENT — PATIENT HEALTH QUESTIONNAIRE - PHQ9
4. FEELING TIRED OR HAVING LITTLE ENERGY: NEARLY EVERY DAY
9. THOUGHTS THAT YOU WOULD BE BETTER OFF DEAD, OR OF HURTING YOURSELF: MORE THAN HALF THE DAYS
2. FEELING DOWN, DEPRESSED OR HOPELESS: NEARLY EVERY DAY
10. IF YOU CHECKED OFF ANY PROBLEMS, HOW DIFFICULT HAVE THESE PROBLEMS MADE IT FOR YOU TO DO YOUR WORK, TAKE CARE OF THINGS AT HOME, OR GET ALONG WITH OTHER PEOPLE: VERY DIFFICULT
1. LITTLE INTEREST OR PLEASURE IN DOING THINGS: NEARLY EVERY DAY
6. FEELING BAD ABOUT YOURSELF - OR THAT YOU ARE A FAILURE OR HAVE LET YOURSELF OR YOUR FAMILY DOWN: MORE THAN HALF THE DAYS
7. TROUBLE CONCENTRATING ON THINGS, SUCH AS READING THE NEWSPAPER OR WATCHING TELEVISION: NEARLY EVERY DAY
3. TROUBLE FALLING OR STAYING ASLEEP OR SLEEPING TOO MUCH: MORE THAN HALF THE DAYS
8. MOVING OR SPEAKING SO SLOWLY THAT OTHER PEOPLE COULD HAVE NOTICED. OR THE OPPOSITE, BEING SO FIGETY OR RESTLESS THAT YOU HAVE BEEN MOVING AROUND A LOT MORE THAN USUAL: SEVERAL DAYS
SUM OF ALL RESPONSES TO PHQ9 QUESTIONS 1 AND 2: 6
5. POOR APPETITE OR OVEREATING: NEARLY EVERY DAY
SUM OF ALL RESPONSES TO PHQ QUESTIONS 1-9: 22

## 2024-01-16 ASSESSMENT — ANXIETY QUESTIONNAIRES
6. BECOMING EASILY ANNOYED OR IRRITABLE: NEARLY EVERY DAY
1. FEELING NERVOUS, ANXIOUS, OR ON EDGE: NEARLY EVERY DAY
4. TROUBLE RELAXING: NEARLY EVERY DAY
5. BEING SO RESTLESS THAT IT IS HARD TO SIT STILL: NEARLY EVERY DAY
2. NOT BEING ABLE TO STOP OR CONTROL WORRYING: NEARLY EVERY DAY
GAD7 TOTAL SCORE: 20
IF YOU CHECKED OFF ANY PROBLEMS ON THIS QUESTIONNAIRE, HOW DIFFICULT HAVE THESE PROBLEMS MADE IT FOR YOU TO DO YOUR WORK, TAKE CARE OF THINGS AT HOME, OR GET ALONG WITH OTHER PEOPLE: VERY DIFFICULT
7. FEELING AFRAID AS IF SOMETHING AWFUL MIGHT HAPPEN: MORE THAN HALF THE DAYS
3. WORRYING TOO MUCH ABOUT DIFFERENT THINGS: NEARLY EVERY DAY

## 2024-01-16 NOTE — PROGRESS NOTES
"Subjective   Patient ID: Harris Tran is a 23 y.o. adult who presents for follow up (Flu vaccine today).    HPI     routine follow up. chronic issues as per assessment and plan.     Review of Systems   Constitutional:  Negative for chills, diaphoresis, fever and unexpected weight change.   HENT:  Negative for congestion, sinus pressure, sinus pain, sneezing and sore throat.    Respiratory:  Negative for cough, chest tightness, shortness of breath and wheezing.    Cardiovascular:  Negative for chest pain, palpitations and leg swelling.   Gastrointestinal:  Negative for abdominal pain, constipation, diarrhea, nausea and vomiting.   Endocrine: Negative for cold intolerance, heat intolerance, polydipsia, polyphagia and polyuria.   Genitourinary:  Negative for dysuria, frequency, hematuria and urgency.   Neurological:  Negative for dizziness, syncope, light-headedness, numbness and headaches.   Hematological:  Negative for adenopathy.   Psychiatric/Behavioral:  Negative for confusion and dysphoric mood. The patient is not nervous/anxious.        Objective   /71 (BP Location: Left arm, Patient Position: Sitting, BP Cuff Size: Large adult long)   Pulse 97   Temp 37.2 °C (99 °F)   Resp 16   Ht 1.727 m (5' 8\")   Wt (!) 170 kg (375 lb)   SpO2 97%   BMI 57.02 kg/m²     Physical Exam  Vitals and nursing note reviewed.   Constitutional:       General: He is not in acute distress.     Appearance: Normal appearance.   HENT:      Head: Normocephalic and atraumatic.      Nose: Nose normal.   Eyes:      Extraocular Movements: Extraocular movements intact.      Conjunctiva/sclera: Conjunctivae normal.      Pupils: Pupils are equal, round, and reactive to light.   Cardiovascular:      Rate and Rhythm: Normal rate and regular rhythm.      Heart sounds: No murmur heard.     No friction rub. No gallop.   Pulmonary:      Effort: Pulmonary effort is normal.      Breath sounds: Normal breath sounds. No wheezing, rhonchi or " rales.   Abdominal:      General: Bowel sounds are normal. There is no distension.      Palpations: Abdomen is soft.      Tenderness: There is no abdominal tenderness.   Musculoskeletal:         General: Normal range of motion.      Cervical back: Normal range of motion and neck supple.   Skin:     General: Skin is warm and dry.   Neurological:      General: No focal deficit present.      Mental Status: He is alert and oriented to person, place, and time.      Deep Tendon Reflexes: Reflexes normal.   Psychiatric:         Mood and Affect: Mood normal.         Behavior: Behavior normal.         Thought Content: Thought content normal.         Judgment: Judgment normal.         Assessment/Plan   Problem List Items Addressed This Visit             ICD-10-CM    Adult hypothyroidism - Primary E03.9     - continue levothyroxine  - check labs          Relevant Medications    levothyroxine (Synthroid, Levoxyl) 100 mcg tablet    Other Relevant Orders    Vitamin B12    CBC and Auto Differential    Comprehensive Metabolic Panel    TSH with reflex to Free T4 if abnormal    BMI 50.0-59.9, adult (CMS/[facility]) Z68.43     Encouraged healthy lifestyle, including adequate exercise and high fiber, low fat and low carb diet.          Cigarette nicotine dependence without complication F17.210     - encouraged cessation         Class 3 severe obesity due to excess calories without serious comorbidity with body mass index (BMI) of 50.0 to 59.9 in adult (CMS/[facility]) E66.01, Z68.43     Encouraged healthy lifestyle, including adequate exercise and high fiber, low fat and low carb diet.          Essential hypertension I10     - controlled. Continue losartan         Relevant Medications    losartan (Cozaar) 25 mg tablet    Other Relevant Orders    Vitamin B12    CBC and Auto Differential    Comprehensive Metabolic Panel    Gastroesophageal reflux disease without esophagitis K21.9     - stable. Continue omeprazole         Relevant Medications     omeprazole (PriLOSEC) 20 mg DR capsule    Other Relevant Orders    Vitamin B12    CBC and Auto Differential    Comprehensive Metabolic Panel    Gastroparesis K31.84     - stable. 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     - has history of this  - primarily manifests as stomach issues  -monitor         Relevant Orders    Vitamin B12    CBC and Auto Differential    Comprehensive Metabolic Panel    Irritable bowel syndrome with diarrhea K58.0    Relevant Orders    Vitamin B12    CBC and Auto Differential    Comprehensive Metabolic Panel    Psychophysiological insomnia F51.04    Relevant Orders    Vitamin B12    CBC and Auto Differential    Comprehensive Metabolic Panel    PTSD (post-traumatic stress disorder) F43.10     - follows with psychiatry          Relevant Medications    DULoxetine (Cymbalta) 60 mg DR capsule    Other Relevant Orders    Vitamin B12    CBC and Auto Differential    Comprehensive Metabolic Panel    Seasonal allergies J30.2     - controlled. Continue cetirizine         Relevant Medications    cetirizine (ZyrTEC) 10 mg tablet    Other Relevant Orders    Vitamin B12    CBC and Auto Differential    Comprehensive Metabolic Panel    Severe anxiety F41.9     - follows with psychiatry          Relevant Medications    DULoxetine (Cymbalta) 60 mg DR capsule    Other Relevant Orders    Vitamin B12    CBC and Auto Differential    Comprehensive Metabolic Panel    Severe major depression (CMS/HCC) F32.2     - follows with psychiatry         Relevant Medications    DULoxetine (Cymbalta) 60 mg DR capsule    Other Relevant Orders    Vitamin B12    CBC and Auto Differential    Comprehensive Metabolic Panel    Vitamin D deficiency E55.9     - check labs          Relevant Orders    Vitamin B12    Vitamin D, Total    CBC and Auto  Differential    Comprehensive Metabolic Panel     Other Visit Diagnoses         Codes    Encounter for immunization     Z23    Relevant Orders    Flu vaccine (IIV4) age 6 months and greater, preservative free (Completed)    Acquired hypothyroidism     E03.9    Relevant Medications    levothyroxine (Synthroid, Levoxyl) 100 mcg tablet    Lipid screening     Z13.220    Relevant Orders    Lipid Panel    Encounter for screening for HIV     Z11.4    Relevant Orders    HIV 1/2 Antigen/Antibody Screen with Reflex to Confirmation    Need for hepatitis C screening test     Z11.59    Relevant Orders    Hepatitis C Antibody

## 2024-01-16 NOTE — PATIENT INSTRUCTIONS
Isabella Tran ,    Thank you for coming in today. We at [facility] appreciate your trust in our care. If you have any questions or concerns about the care you received today, please do not hesitate to contact us at [phone].    The following instructions were discussed today:    - get labs  - For blood work: Nothing to eat or drink for at least 10 hours prior. Okay for water or black coffee.

## 2024-06-08 DIAGNOSIS — K58.0 IRRITABLE BOWEL SYNDROME WITH DIARRHEA: ICD-10-CM

## 2024-06-10 RX ORDER — HYOSCYAMINE SULFATE 0.12 MG/1
TABLET, ORALLY DISINTEGRATING ORAL
Qty: 360 TABLET | Refills: 1 | OUTPATIENT
Start: 2024-06-10

## 2024-07-12 DIAGNOSIS — F43.10 PTSD (POST-TRAUMATIC STRESS DISORDER): ICD-10-CM

## 2024-07-12 DIAGNOSIS — K21.9 GASTROESOPHAGEAL REFLUX DISEASE WITHOUT ESOPHAGITIS: ICD-10-CM

## 2024-07-12 DIAGNOSIS — F32.2 SEVERE MAJOR DEPRESSION (MULTI): ICD-10-CM

## 2024-07-12 DIAGNOSIS — F41.9 SEVERE ANXIETY: ICD-10-CM

## 2024-07-12 DIAGNOSIS — I10 ESSENTIAL HYPERTENSION: ICD-10-CM

## 2024-07-12 RX ORDER — OMEPRAZOLE 20 MG/1
CAPSULE, DELAYED RELEASE ORAL
Qty: 90 CAPSULE | Refills: 1 | OUTPATIENT
Start: 2024-07-12

## 2024-07-12 RX ORDER — DULOXETIN HYDROCHLORIDE 60 MG/1
120 CAPSULE, DELAYED RELEASE ORAL NIGHTLY
Qty: 180 CAPSULE | Refills: 1 | OUTPATIENT
Start: 2024-07-12

## 2024-07-12 RX ORDER — LOSARTAN POTASSIUM 25 MG/1
25 TABLET ORAL DAILY
Qty: 90 TABLET | Refills: 1 | OUTPATIENT
Start: 2024-07-12

## 2024-07-16 ENCOUNTER — APPOINTMENT (OUTPATIENT)
Dept: PRIMARY CARE | Facility: CLINIC | Age: 24
End: 2024-07-16
Payer: COMMERCIAL

## 2024-07-16 VITALS
BODY MASS INDEX: 44.41 KG/M2 | HEIGHT: 68 IN | OXYGEN SATURATION: 98 % | RESPIRATION RATE: 16 BRPM | WEIGHT: 293 LBS | HEART RATE: 95 BPM | SYSTOLIC BLOOD PRESSURE: 138 MMHG | DIASTOLIC BLOOD PRESSURE: 88 MMHG

## 2024-07-16 DIAGNOSIS — K31.84 GASTROPARESIS: ICD-10-CM

## 2024-07-16 DIAGNOSIS — E03.9 ADULT HYPOTHYROIDISM: ICD-10-CM

## 2024-07-16 DIAGNOSIS — F17.210 CIGARETTE NICOTINE DEPENDENCE WITHOUT COMPLICATION: ICD-10-CM

## 2024-07-16 DIAGNOSIS — S61.519A SELF-CUTTING OF WRIST (MULTI): ICD-10-CM

## 2024-07-16 DIAGNOSIS — Z13.220 LIPID SCREENING: ICD-10-CM

## 2024-07-16 DIAGNOSIS — K21.9 GASTROESOPHAGEAL REFLUX DISEASE WITHOUT ESOPHAGITIS: ICD-10-CM

## 2024-07-16 DIAGNOSIS — F43.10 PTSD (POST-TRAUMATIC STRESS DISORDER): ICD-10-CM

## 2024-07-16 DIAGNOSIS — E55.9 VITAMIN D DEFICIENCY: ICD-10-CM

## 2024-07-16 DIAGNOSIS — I10 ESSENTIAL HYPERTENSION: ICD-10-CM

## 2024-07-16 DIAGNOSIS — F41.9 SEVERE ANXIETY: ICD-10-CM

## 2024-07-16 DIAGNOSIS — E66.01 CLASS 3 SEVERE OBESITY DUE TO EXCESS CALORIES WITHOUT SERIOUS COMORBIDITY WITH BODY MASS INDEX (BMI) OF 50.0 TO 59.9 IN ADULT (MULTI): ICD-10-CM

## 2024-07-16 DIAGNOSIS — F51.04 PSYCHOPHYSIOLOGICAL INSOMNIA: ICD-10-CM

## 2024-07-16 DIAGNOSIS — Z11.4 ENCOUNTER FOR SCREENING FOR HIV: ICD-10-CM

## 2024-07-16 DIAGNOSIS — D80.2 IGA DEFICIENCY (MULTI): ICD-10-CM

## 2024-07-16 DIAGNOSIS — Z11.59 NEED FOR HEPATITIS C SCREENING TEST: ICD-10-CM

## 2024-07-16 DIAGNOSIS — F32.2 SEVERE MAJOR DEPRESSION (MULTI): Primary | ICD-10-CM

## 2024-07-16 DIAGNOSIS — N93.9 ABNORMAL UTERINE BLEEDING: ICD-10-CM

## 2024-07-16 DIAGNOSIS — J30.2 SEASONAL ALLERGIES: ICD-10-CM

## 2024-07-16 DIAGNOSIS — Z12.4 CERVICAL CANCER SCREENING: ICD-10-CM

## 2024-07-16 DIAGNOSIS — K58.0 IRRITABLE BOWEL SYNDROME WITH DIARRHEA: ICD-10-CM
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
## 2024-07-16 PROCEDURE — 3008F BODY MASS INDEX DOCD: CPT | Performed by: FAMILY MEDICINE

## 2024-07-16 PROCEDURE — 99214 OFFICE O/P EST MOD 30 MIN: CPT | Performed by: FAMILY MEDICINE

## 2024-07-16 PROCEDURE — 3079F DIAST BP 80-89 MM HG: CPT | Performed by: FAMILY MEDICINE

## 2024-07-16 PROCEDURE — 4004F PT TOBACCO SCREEN RCVD TLK: CPT | Performed by: FAMILY MEDICINE

## 2024-07-16 PROCEDURE — 3075F SYST BP GE 130 - 139MM HG: CPT | Performed by: FAMILY MEDICINE

## 2024-07-16 RX ORDER — HYOSCYAMINE SULFATE 0.12 MG/1
0.12 TABLET, ORALLY DISINTEGRATING ORAL 4 TIMES DAILY PRN
Qty: 360 TABLET | Refills: 1 | Status: SHIPPED | OUTPATIENT
Start: 2024-07-16 | End: 2025-01-12

## 2024-07-16 RX ORDER — METOCLOPRAMIDE 5 MG/1
5 TABLET ORAL 4 TIMES DAILY
Qty: 360 TABLET | Refills: 1 | Status: SHIPPED | OUTPATIENT
Start: 2024-07-16

## 2024-07-16 RX ORDER — LEVOTHYROXINE SODIUM 100 UG/1
100 TABLET ORAL DAILY
Qty: 90 TABLET | Refills: 1 | Status: SHIPPED | OUTPATIENT
Start: 2024-07-16 | End: 2025-01-12

## 2024-07-16 RX ORDER — DULOXETIN HYDROCHLORIDE 60 MG/1
120 CAPSULE, DELAYED RELEASE ORAL NIGHTLY
Qty: 180 CAPSULE | Refills: 1 | Status: SHIPPED | OUTPATIENT
Start: 2024-07-16

## 2024-07-16 RX ORDER — LOSARTAN POTASSIUM 25 MG/1
25 TABLET ORAL DAILY
Qty: 90 TABLET | Refills: 1 | Status: SHIPPED | OUTPATIENT
Start: 2024-07-16 | End: 2025-01-12

## 2024-07-16 RX ORDER — ONDANSETRON 4 MG/1
4 TABLET, ORALLY DISINTEGRATING ORAL EVERY 8 HOURS PRN
Qty: 20 TABLET | Refills: 0 | Status: SHIPPED | OUTPATIENT
Start: 2024-07-16

## 2024-07-16 RX ORDER — OMEPRAZOLE 20 MG/1
20 CAPSULE, DELAYED RELEASE ORAL
Qty: 90 CAPSULE | Refills: 1 | Status: SHIPPED | OUTPATIENT
Start: 2024-07-16

## 2024-07-16 ASSESSMENT — ENCOUNTER SYMPTOMS
DIZZINESS: 0
CONSTIPATION: 0
CHILLS: 0
DYSURIA: 0
ABDOMINAL PAIN: 0
COUGH: 0
SINUS PAIN: 0
SHORTNESS OF BREATH: 0
WHEEZING: 0
PALPITATIONS: 0
FREQUENCY: 0
UNEXPECTED WEIGHT CHANGE: 0
SORE THROAT: 0
DYSPHORIC MOOD: 0
NAUSEA: 0
HEMATURIA: 0
CHEST TIGHTNESS: 0
ADENOPATHY: 0
POLYDIPSIA: 0
NERVOUS/ANXIOUS: 0
HEADACHES: 0
DIARRHEA: 0
FEVER: 0
DIAPHORESIS: 0
NUMBNESS: 0
POLYPHAGIA: 0
SINUS PRESSURE: 0
VOMITING: 0
LIGHT-HEADEDNESS: 0
CONFUSION: 0

## 2024-07-16 ASSESSMENT — PATIENT HEALTH QUESTIONNAIRE - PHQ9
SUM OF ALL RESPONSES TO PHQ QUESTIONS 1-9: 22
7. TROUBLE CONCENTRATING ON THINGS, SUCH AS READING THE NEWSPAPER OR WATCHING TELEVISION: NEARLY EVERY DAY
6. FEELING BAD ABOUT YOURSELF - OR THAT YOU ARE A FAILURE OR HAVE LET YOURSELF OR YOUR FAMILY DOWN: MORE THAN HALF THE DAYS
10. IF YOU CHECKED OFF ANY PROBLEMS, HOW DIFFICULT HAVE THESE PROBLEMS MADE IT FOR YOU TO DO YOUR WORK, TAKE CARE OF THINGS AT HOME, OR GET ALONG WITH OTHER PEOPLE: EXTREMELY DIFFICULT
5. POOR APPETITE OR OVEREATING: MORE THAN HALF THE DAYS
1. LITTLE INTEREST OR PLEASURE IN DOING THINGS: NEARLY EVERY DAY
9. THOUGHTS THAT YOU WOULD BE BETTER OFF DEAD, OR OF HURTING YOURSELF: MORE THAN HALF THE DAYS
SUM OF ALL RESPONSES TO PHQ9 QUESTIONS 1 & 2: 6
8. MOVING OR SPEAKING SO SLOWLY THAT OTHER PEOPLE COULD HAVE NOTICED. OR THE OPPOSITE, BEING SO FIGETY OR RESTLESS THAT YOU HAVE BEEN MOVING AROUND A LOT MORE THAN USUAL: SEVERAL DAYS
2. FEELING DOWN, DEPRESSED OR HOPELESS: NEARLY EVERY DAY
4. FEELING TIRED OR HAVING LITTLE ENERGY: NEARLY EVERY DAY
3. TROUBLE FALLING OR STAYING ASLEEP: NEARLY EVERY DAY

## 2024-07-16 ASSESSMENT — ANXIETY QUESTIONNAIRES
2. NOT BEING ABLE TO STOP OR CONTROL WORRYING: MORE THAN HALF THE DAYS
7. FEELING AFRAID AS IF SOMETHING AWFUL MIGHT HAPPEN: MORE THAN HALF THE DAYS
3. WORRYING TOO MUCH ABOUT DIFFERENT THINGS: NEARLY EVERY DAY
4. TROUBLE RELAXING: SEVERAL DAYS
GAD7 TOTAL SCORE: 14
6. BECOMING EASILY ANNOYED OR IRRITABLE: NEARLY EVERY DAY
1. FEELING NERVOUS, ANXIOUS, OR ON EDGE: MORE THAN HALF THE DAYS
IF YOU CHECKED OFF ANY PROBLEMS ON THIS QUESTIONNAIRE, HOW DIFFICULT HAVE THESE PROBLEMS MADE IT FOR YOU TO DO YOUR WORK, TAKE CARE OF THINGS AT HOME, OR GET ALONG WITH OTHER PEOPLE: EXTREMELY DIFFICULT
5. BEING SO RESTLESS THAT IT IS HARD TO SIT STILL: SEVERAL DAYS

## 2024-07-16 NOTE — PROGRESS NOTES
"Subjective   Patient ID: Harris Tran is a 23 y.o. adult who presents for menstrual concerns.    HPI   routine follow up. chronic issues as per assessment and plan.     Her depression/ anxiety has been worse. No longer seeing her psychiatrist as her psychiatrist left the practice.     Having issues with her menses. States she is getting very bad cramping and that is lasting all month. Getting a period once a month and it lasts 3-6 days. Will have spotting in between menses.     Review of Systems   Constitutional:  Negative for chills, diaphoresis, fever and unexpected weight change.   HENT:  Negative for congestion, sinus pressure, sinus pain, sneezing and sore throat.    Respiratory:  Negative for cough, chest tightness, shortness of breath and wheezing.    Cardiovascular:  Negative for chest pain, palpitations and leg swelling.   Gastrointestinal:  Negative for abdominal pain, constipation, diarrhea, nausea and vomiting.   Endocrine: Negative for cold intolerance, heat intolerance, polydipsia, polyphagia and polyuria.   Genitourinary:  Negative for dysuria, frequency, hematuria and urgency.   Neurological:  Negative for dizziness, syncope, light-headedness, numbness and headaches.   Hematological:  Negative for adenopathy.   Psychiatric/Behavioral:  Negative for confusion and dysphoric mood. The patient is not nervous/anxious.        Objective   /88 (BP Location: Left arm, Patient Position: Sitting, BP Cuff Size: Adult) Comment (BP Location): forearm Comment (BP Cuff Size): forearm  Pulse 95   Resp 16   Ht 1.727 m (5' 8\")   Wt (!) 164 kg (362 lb)   SpO2 98%   BMI 55.04 kg/m²     Physical Exam  Vitals and nursing note reviewed.   Constitutional:       General: He is not in acute distress.     Appearance: Normal appearance.   HENT:      Head: Normocephalic and atraumatic.      Nose: Nose normal.   Eyes:      Extraocular Movements: Extraocular movements intact.      Conjunctiva/sclera: Conjunctivae " normal.      Pupils: Pupils are equal, round, and reactive to light.   Cardiovascular:      Rate and Rhythm: Normal rate and regular rhythm.      Heart sounds: No murmur heard.     No friction rub. No gallop.   Pulmonary:      Effort: Pulmonary effort is normal.      Breath sounds: Normal breath sounds. No wheezing, rhonchi or rales.   Abdominal:      General: Bowel sounds are normal. There is no distension.      Palpations: Abdomen is soft.      Tenderness: There is no abdominal tenderness.   Musculoskeletal:         General: Normal range of motion.      Cervical back: Normal range of motion and neck supple.   Skin:     General: Skin is warm and dry.   Neurological:      General: No focal deficit present.      Mental Status: He is alert and oriented to person, place, and time.      Deep Tendon Reflexes: Reflexes normal.   Psychiatric:         Mood and Affect: Mood normal.         Behavior: Behavior normal.         Thought Content: Thought content normal.         Judgment: Judgment normal.         Assessment/Plan   Problem List Items Addressed This Visit             ICD-10-CM    Abnormal uterine bleeding N93.9     - discussed birth control and NSAIDs. She declines at this time  - refer to OB/GYN  - check labs          Relevant Orders    CBC and Auto Differential    TSH with reflex to Free T4 if abnormal    Referral to Obstetrics / Gynecology    Adult hypothyroidism E03.9     - continue levothyroxine  - check labs          Relevant Medications    levothyroxine (Synthroid, Levoxyl) 100 mcg tablet    Other Relevant Orders    Vitamin B12    CBC and Auto Differential    Comprehensive Metabolic Panel    TSH with reflex to Free T4 if abnormal    BMI 50.0-59.9, adult (Multi) Z68.43     Encouraged healthy lifestyle, including adequate exercise and high fiber, low fat and low carb diet.          Cervical cancer screening Z12.4     - refer to gynecology          Relevant Orders    Referral to Obstetrics / Gynecology     Cigarette nicotine dependence without complication F17.210     - encouraged cessation         Class 3 severe obesity due to excess calories without serious comorbidity with body mass index (BMI) of 50.0 to 59.9 in adult (Multi) E66.01, Z68.43     Encouraged healthy lifestyle, including adequate exercise and high fiber, low fat and low carb diet.          Essential hypertension I10     - controlled. Continue losartan         Relevant Medications    losartan (Cozaar) 25 mg tablet    Other Relevant Orders    Vitamin B12    CBC and Auto Differential    Comprehensive Metabolic Panel    Gastroesophageal reflux disease without esophagitis K21.9     - stable. Continue omeprazole         Relevant Medications    omeprazole (PriLOSEC) 20 mg DR capsule    ondansetron ODT (Zofran-ODT) 4 mg disintegrating tablet    Other Relevant Orders    Vitamin B12    CBC and Auto Differential    Comprehensive Metabolic Panel    Gastroparesis K31.84     - stable. 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 Continue trazodone         Relevant Orders    Vitamin B12    CBC and Auto Differential    Comprehensive Metabolic Panel    PTSD (post-traumatic stress disorder)  F43.10     - suboptimally controlled  - no longer seeing her psychiatrist as her psychiatrist left the practice.   - continue duloxetine  - recommended he schedule with psychiatry. He states that he can see a psychiatrist at the same practice he was at          Relevant Medications    DULoxetine (Cymbalta) 60 mg DR capsule    Other Relevant Orders    Vitamin B12    CBC and Auto Differential    Comprehensive Metabolic Panel    Seasonal allergies J30.2     - controlled. Continue cetirizine         Relevant Orders    Vitamin B12    CBC and Auto Differential    Comprehensive Metabolic Panel    Self-cutting of wrist (Multi) S61.519A, X78.9XXA     - suboptimally controlled  - no longer seeing her psychiatrist as her psychiatrist left the practice.   - continue duloxetine  - recommended he schedule with psychiatry. He states that he can see a psychiatrist at the same practice he was at          Severe anxiety F41.9     - suboptimally controlled  - no longer seeing her psychiatrist as her psychiatrist left the practice.   - continue duloxetine  - recommended he schedule with psychiatry. He states that he can see a psychiatrist at the same practice he was at          Relevant Medications    DULoxetine (Cymbalta) 60 mg DR capsule    Other Relevant Orders    Vitamin B12    CBC and Auto Differential    Comprehensive Metabolic Panel    Severe major depression (Multi) - Primary F32.2     - suboptimally controlled  - no longer seeing her psychiatrist as her psychiatrist left the practice.   - continue duloxetine  - recommended he schedule with psychiatry. He states that he can see a psychiatrist at the same practice he was at          Relevant Medications    DULoxetine (Cymbalta) 60 mg DR capsule    Other Relevant Orders    Vitamin B12    CBC and Auto Differential    Comprehensive Metabolic Panel    Vitamin D deficiency E55.9     - check labs          Relevant Orders    Vitamin B12    Vitamin D, Total    CBC and Auto Differential     Comprehensive Metabolic Panel     Other Visit Diagnoses         Codes    Lipid screening     Z13.220    Relevant Orders    Lipid Panel    Encounter for screening for HIV     Z11.4    Relevant Orders    HIV 1/2 Antigen/Antibody Screen with Reflex to Confirmation    Need for hepatitis C screening test     Z11.59    Relevant Orders    Hepatitis C Antibody

## 2024-07-16 NOTE — ASSESSMENT & PLAN NOTE
- suboptimally controlled  - no longer seeing her psychiatrist as her psychiatrist left the practice.   - continue duloxetine  - recommended he schedule with psychiatry. He states that he can see a psychiatrist at the same practice he was at

## 2024-07-16 NOTE — PATIENT INSTRUCTIONS
Isabella Tran ,    Thank you for coming in today. We at [facility] appreciate your trust in our care. If you have any questions or concerns about the care you received today, please do not hesitate to contact us at [phone].    The following instructions were discussed today:    - check labs. For blood work: Nothing to eat or drink for at least 10 hours prior. Okay for water or black coffee.   - schedule to see psychiatry   - refer to gynecology   - Follow up in 3 months for physical

## 2024-09-10 ENCOUNTER — OFFICE VISIT (OUTPATIENT)
Dept: OBSTETRICS AND GYNECOLOGY | Facility: CLINIC | Age: 24
End: 2024-09-10
Payer: COMMERCIAL

## 2024-09-10 VITALS
BODY MASS INDEX: 44.41 KG/M2 | DIASTOLIC BLOOD PRESSURE: 87 MMHG | SYSTOLIC BLOOD PRESSURE: 140 MMHG | HEIGHT: 68 IN | WEIGHT: 293 LBS

## 2024-09-10 DIAGNOSIS — Z12.4 CERVICAL CANCER SCREENING: ICD-10-CM

## 2024-09-10 DIAGNOSIS — Z01.419 WELL WOMAN EXAM: Primary | ICD-10-CM

## 2024-09-10 DIAGNOSIS — N93.9 ABNORMAL UTERINE BLEEDING: ICD-10-CM

## 2024-09-10 PROCEDURE — 99385 PREV VISIT NEW AGE 18-39: CPT | Performed by: NURSE PRACTITIONER

## 2024-09-10 PROCEDURE — 3079F DIAST BP 80-89 MM HG: CPT | Performed by: NURSE PRACTITIONER

## 2024-09-10 PROCEDURE — 4004F PT TOBACCO SCREEN RCVD TLK: CPT | Performed by: NURSE PRACTITIONER

## 2024-09-10 PROCEDURE — 87491 CHLMYD TRACH DNA AMP PROBE: CPT | Performed by: NURSE PRACTITIONER

## 2024-09-10 PROCEDURE — 3077F SYST BP >= 140 MM HG: CPT | Performed by: NURSE PRACTITIONER

## 2024-09-10 PROCEDURE — 87661 TRICHOMONAS VAGINALIS AMPLIF: CPT | Performed by: NURSE PRACTITIONER

## 2024-09-10 PROCEDURE — 3008F BODY MASS INDEX DOCD: CPT | Performed by: NURSE PRACTITIONER

## 2024-09-10 ASSESSMENT — LIFESTYLE VARIABLES
HOW OFTEN DO YOU HAVE SIX OR MORE DRINKS ON ONE OCCASION: NEVER
SKIP TO QUESTIONS 9-10: 1
HOW OFTEN DO YOU HAVE A DRINK CONTAINING ALCOHOL: NEVER
AUDIT-C TOTAL SCORE: 0
HOW MANY STANDARD DRINKS CONTAINING ALCOHOL DO YOU HAVE ON A TYPICAL DAY: PATIENT DOES NOT DRINK

## 2024-09-10 ASSESSMENT — ENCOUNTER SYMPTOMS
CARDIOVASCULAR NEGATIVE: 0
CONSTITUTIONAL NEGATIVE: 0
EYE PAIN: 1
ENDOCRINE NEGATIVE: 0
HEMATOLOGIC/LYMPHATIC NEGATIVE: 0
CONSTIPATION: 1
DEPRESSION: 0
FATIGUE: 1
EYES NEGATIVE: 0
LOSS OF SENSATION IN FEET: 0
MUSCULOSKELETAL NEGATIVE: 0
ALLERGIC/IMMUNOLOGIC NEGATIVE: 0
DIARRHEA: 1
GASTROINTESTINAL NEGATIVE: 0
RESPIRATORY NEGATIVE: 0
OCCASIONAL FEELINGS OF UNSTEADINESS: 0
NAUSEA: 1
BACK PAIN: 1
NEUROLOGICAL NEGATIVE: 0
PSYCHIATRIC NEGATIVE: 0
ABDOMINAL PAIN: 1

## 2024-09-10 ASSESSMENT — PAIN SCALES - GENERAL: PAINLEVEL: 0-NO PAIN

## 2024-09-10 NOTE — PROGRESS NOTES
"Kalyn Sainz, APRN-CNP     Subjective   Isabella Tran \"Harris\" is a 23 y.o. adult who presents for annual exam.   Harris is a 23-year-old  patient new to the practice to establish care and to discuss painful menstrual cramps.    Medical history reviewed and found significant for hypertension, hypothyroidism, and IgA deficiency.    Harris has been sexually active with both male and female and currently is not using any method of birth control.  He is leading a nonbinary lifestyle and does state that if money were available he may like to consider investigating the option of transitioning.  Past Medical History:   Diagnosis Date    Displaced fracture of fifth metatarsal bone, left foot, subsequent encounter for fracture with malunion 2021    Closed displaced fracture of fifth metatarsal bone of left foot with malunion, subsequent encounter    HTN (hypertension)     Hypothyroidism     IgA deficiency (Multi)     Other symptoms and signs involving the musculoskeletal system 2020    Ankle weakness    Personal history of other diseases of the digestive system 2021    History of rectal bleeding    Suicide attempt, initial encounter (Multi)     Suicidal behavior with attempted self-injury     Past Surgical History:   Procedure Laterality Date    OTHER SURGICAL HISTORY  2021    Tonsillectomy with adenoidectomy    WISDOM TOOTH EXTRACTION         OB History          0    Para   0    Term   0       0    AB   0    Living   0         SAB   0    IAB   0    Ectopic   0    Multiple   0    Live Births   0               Patient's last menstrual period was 2024.      Review of Systems   Constitutional:  Positive for fatigue.   Eyes:  Positive for pain.   Gastrointestinal:  Positive for abdominal pain, constipation, diarrhea and nausea.   Genitourinary:  Positive for menstrual problem.   Musculoskeletal:  Positive for back pain.   All other systems reviewed and are " "negative.    Breast: No Complaints   Vaginal: No Complaints        Objective   /87   Ht 1.727 m (5' 8\")   Wt (!) 168 kg (370 lb)   LMP 08/27/2024   BMI 56.26 kg/m²   Physical Exam  Constitutional:       Appearance: He is obese.   Genitourinary:      Urethral meatus normal.      Right Labia: No rash.     Left Labia: No rash.     No vaginal discharge.      No vaginal prolapse present.     No vaginal atrophy present.       Right Adnexa: no mass present.     Left Adnexa: no mass present.     Cervix is nulliparous.      Uterus is not enlarged.   Breasts:     Right: Normal.      Left: Normal.   HENT:      Head: Normocephalic.   Cardiovascular:      Rate and Rhythm: Normal rate.   Pulmonary:      Effort: Pulmonary effort is normal.      Breath sounds: Normal breath sounds.   Abdominal:      Palpations: Abdomen is soft.   Musculoskeletal:         General: Normal range of motion.      Cervical back: Normal range of motion and neck supple.   Neurological:      Mental Status: He is alert.   Skin:     General: Skin is warm.   Psychiatric:      Comments: Flat affect                   Assessment/Plan   Problem List Items Addressed This Visit             ICD-10-CM    Abnormal uterine bleeding N93.9    Cervical cancer screening Z12.4     Other Visit Diagnoses         Codes    Well woman exam    -  Primary Z01.419    Relevant Orders    THINPREP PAP TEST        Options to help with menstrual regularity and pain discussed with patient.  Not a good candidate for estrogen-containing options due to hypertension.  Not a good candidate for Depo-Provera due to obesity.  Did discuss the options of progesterone containing birth control pills, Nexplanon subdermal implant, progesterone containing IUD.    Patient is uncertain on future childbearing wishes at this time.  "

## 2024-09-11 LAB
C TRACH RRNA SPEC QL NAA+PROBE: NEGATIVE
N GONORRHOEA DNA SPEC QL PROBE+SIG AMP: NEGATIVE
T VAGINALIS RRNA SPEC QL NAA+PROBE: NEGATIVE

## 2024-09-24 LAB
CYTOLOGY CMNT CVX/VAG CYTO-IMP: NORMAL
LAB AP HPV GENOTYPE QUESTION: YES
LAB AP HPV HR: NORMAL
LAB AP PAP ADDITIONAL TESTS: NORMAL
LABORATORY COMMENT REPORT: NORMAL
LMP START DATE: NORMAL
PATH REPORT.TOTAL CANCER: NORMAL

## 2024-10-23 ENCOUNTER — APPOINTMENT (OUTPATIENT)
Dept: PRIMARY CARE | Facility: CLINIC | Age: 24
End: 2024-10-23
Payer: COMMERCIAL

## 2024-10-23 VITALS
RESPIRATION RATE: 16 BRPM | WEIGHT: 293 LBS | BODY MASS INDEX: 44.41 KG/M2 | TEMPERATURE: 98.4 F | OXYGEN SATURATION: 95 % | HEIGHT: 68 IN | DIASTOLIC BLOOD PRESSURE: 100 MMHG | HEART RATE: 86 BPM | SYSTOLIC BLOOD PRESSURE: 158 MMHG

## 2024-10-23 DIAGNOSIS — F43.10 PTSD (POST-TRAUMATIC STRESS DISORDER): ICD-10-CM

## 2024-10-23 DIAGNOSIS — Z23 ENCOUNTER FOR IMMUNIZATION: ICD-10-CM

## 2024-10-23 DIAGNOSIS — Z00.00 WELL ADULT EXAM: Primary | ICD-10-CM

## 2024-10-23 DIAGNOSIS — K21.9 GASTROESOPHAGEAL REFLUX DISEASE WITHOUT ESOPHAGITIS: ICD-10-CM

## 2024-10-23 DIAGNOSIS — K58.0 IRRITABLE BOWEL SYNDROME WITH DIARRHEA: ICD-10-CM

## 2024-10-23 DIAGNOSIS — F32.2 SEVERE MAJOR DEPRESSION (MULTI): ICD-10-CM
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
## 2024-10-23 DIAGNOSIS — E03.9 ADULT HYPOTHYROIDISM: ICD-10-CM

## 2024-10-23 DIAGNOSIS — E66.813 CLASS 3 SEVERE OBESITY DUE TO EXCESS CALORIES WITHOUT SERIOUS COMORBIDITY WITH BODY MASS INDEX (BMI) OF 50.0 TO 59.9 IN ADULT: ICD-10-CM

## 2024-10-23 DIAGNOSIS — F17.210 CIGARETTE NICOTINE DEPENDENCE WITHOUT COMPLICATION: ICD-10-CM

## 2024-10-23 DIAGNOSIS — F41.9 SEVERE ANXIETY: ICD-10-CM

## 2024-10-23 DIAGNOSIS — Z11.4 ENCOUNTER FOR SCREENING FOR HIV: ICD-10-CM

## 2024-10-23 DIAGNOSIS — E55.9 VITAMIN D DEFICIENCY: ICD-10-CM

## 2024-10-23 DIAGNOSIS — S61.519A SELF-CUTTING OF WRIST (MULTI): ICD-10-CM

## 2024-10-23 DIAGNOSIS — Z13.220 LIPID SCREENING: ICD-10-CM

## 2024-10-23 DIAGNOSIS — J30.2 SEASONAL ALLERGIES: ICD-10-CM

## 2024-10-23 DIAGNOSIS — K31.84 GASTROPARESIS: ICD-10-CM

## 2024-10-23 DIAGNOSIS — Z11.59 NEED FOR HEPATITIS C SCREENING TEST: ICD-10-CM

## 2024-10-23 DIAGNOSIS — D80.2 IGA DEFICIENCY (MULTI): ICD-10-CM

## 2024-10-23 DIAGNOSIS — F51.04 PSYCHOPHYSIOLOGICAL INSOMNIA: ICD-10-CM

## 2024-10-23 DIAGNOSIS — E66.01 CLASS 3 SEVERE OBESITY DUE TO EXCESS CALORIES WITHOUT SERIOUS COMORBIDITY WITH BODY MASS INDEX (BMI) OF 50.0 TO 59.9 IN ADULT: ICD-10-CM

## 2024-10-23 DIAGNOSIS — I10 ESSENTIAL HYPERTENSION: ICD-10-CM

## 2024-10-23 PROCEDURE — 4004F PT TOBACCO SCREEN RCVD TLK: CPT | Performed by: FAMILY MEDICINE

## 2024-10-23 PROCEDURE — 3080F DIAST BP >= 90 MM HG: CPT | Performed by: FAMILY MEDICINE

## 2024-10-23 PROCEDURE — 99214 OFFICE O/P EST MOD 30 MIN: CPT | Performed by: FAMILY MEDICINE

## 2024-10-23 PROCEDURE — 90471 IMMUNIZATION ADMIN: CPT | Performed by: FAMILY MEDICINE

## 2024-10-23 PROCEDURE — 3008F BODY MASS INDEX DOCD: CPT | Performed by: FAMILY MEDICINE

## 2024-10-23 PROCEDURE — 3077F SYST BP >= 140 MM HG: CPT | Performed by: FAMILY MEDICINE

## 2024-10-23 PROCEDURE — 99395 PREV VISIT EST AGE 18-39: CPT | Performed by: FAMILY MEDICINE

## 2024-10-23 PROCEDURE — 90656 IIV3 VACC NO PRSV 0.5 ML IM: CPT | Performed by: FAMILY MEDICINE

## 2024-10-23 RX ORDER — METOCLOPRAMIDE 5 MG/1
5 TABLET ORAL 4 TIMES DAILY
Qty: 360 TABLET | Refills: 1 | Status: SHIPPED | OUTPATIENT
Start: 2024-10-23

## 2024-10-23 RX ORDER — HYDROXYZINE PAMOATE 25 MG/1
25 CAPSULE ORAL 4 TIMES DAILY PRN
Qty: 120 CAPSULE | Refills: 0 | Status: SHIPPED | OUTPATIENT
Start: 2024-10-23

## 2024-10-23 RX ORDER — OMEPRAZOLE 20 MG/1
20 CAPSULE, DELAYED RELEASE ORAL
Qty: 90 CAPSULE | Refills: 1 | Status: SHIPPED | OUTPATIENT
Start: 2024-10-23

## 2024-10-23 RX ORDER — DULOXETIN HYDROCHLORIDE 60 MG/1
120 CAPSULE, DELAYED RELEASE ORAL NIGHTLY
Qty: 180 CAPSULE | Refills: 1 | Status: SHIPPED | OUTPATIENT
Start: 2024-10-23

## 2024-10-23 RX ORDER — LOSARTAN POTASSIUM 50 MG/1
50 TABLET ORAL DAILY
Qty: 90 TABLET | Refills: 1 | Status: SHIPPED | OUTPATIENT
Start: 2024-10-23 | End: 2025-04-21

## 2024-10-23 ASSESSMENT — ENCOUNTER SYMPTOMS
FATIGUE: 1
CONSTIPATION: 0
HEMATURIA: 0
NAUSEA: 0
CONFUSION: 0
DIARRHEA: 0
NERVOUS/ANXIOUS: 0
SHORTNESS OF BREATH: 0
POLYPHAGIA: 0
FEVER: 0
UNEXPECTED WEIGHT CHANGE: 0
DIZZINESS: 0
ABDOMINAL PAIN: 0
LIGHT-HEADEDNESS: 0
NUMBNESS: 0
POLYDIPSIA: 0
DYSPHORIC MOOD: 1
DYSURIA: 0
FREQUENCY: 0
WHEEZING: 0
DIAPHORESIS: 0
SINUS PRESSURE: 0
COUGH: 0
CHEST TIGHTNESS: 0
HEADACHES: 0
ADENOPATHY: 0
PALPITATIONS: 0
SORE THROAT: 0
SINUS PAIN: 0
VOMITING: 0
CHILLS: 0

## 2024-10-23 ASSESSMENT — ANXIETY QUESTIONNAIRES
3. WORRYING TOO MUCH ABOUT DIFFERENT THINGS: NEARLY EVERY DAY
4. TROUBLE RELAXING: NEARLY EVERY DAY
6. BECOMING EASILY ANNOYED OR IRRITABLE: MORE THAN HALF THE DAYS
5. BEING SO RESTLESS THAT IT IS HARD TO SIT STILL: MORE THAN HALF THE DAYS
GAD7 TOTAL SCORE: 16
1. FEELING NERVOUS, ANXIOUS, OR ON EDGE: NEARLY EVERY DAY
7. FEELING AFRAID AS IF SOMETHING AWFUL MIGHT HAPPEN: SEVERAL DAYS
IF YOU CHECKED OFF ANY PROBLEMS ON THIS QUESTIONNAIRE, HOW DIFFICULT HAVE THESE PROBLEMS MADE IT FOR YOU TO DO YOUR WORK, TAKE CARE OF THINGS AT HOME, OR GET ALONG WITH OTHER PEOPLE: VERY DIFFICULT
2. NOT BEING ABLE TO STOP OR CONTROL WORRYING: MORE THAN HALF THE DAYS

## 2024-10-23 ASSESSMENT — PATIENT HEALTH QUESTIONNAIRE - PHQ9
3. TROUBLE FALLING OR STAYING ASLEEP OR SLEEPING TOO MUCH: NEARLY EVERY DAY
4. FEELING TIRED OR HAVING LITTLE ENERGY: NEARLY EVERY DAY
2. FEELING DOWN, DEPRESSED OR HOPELESS: MORE THAN HALF THE DAYS
10. IF YOU CHECKED OFF ANY PROBLEMS, HOW DIFFICULT HAVE THESE PROBLEMS MADE IT FOR YOU TO DO YOUR WORK, TAKE CARE OF THINGS AT HOME, OR GET ALONG WITH OTHER PEOPLE: VERY DIFFICULT
1. LITTLE INTEREST OR PLEASURE IN DOING THINGS: NEARLY EVERY DAY
SUM OF ALL RESPONSES TO PHQ9 QUESTIONS 1 AND 2: 5
9. THOUGHTS THAT YOU WOULD BE BETTER OFF DEAD, OR OF HURTING YOURSELF: SEVERAL DAYS
5. POOR APPETITE OR OVEREATING: NEARLY EVERY DAY
SUM OF ALL RESPONSES TO PHQ QUESTIONS 1-9: 21
7. TROUBLE CONCENTRATING ON THINGS, SUCH AS READING THE NEWSPAPER OR WATCHING TELEVISION: MORE THAN HALF THE DAYS
8. MOVING OR SPEAKING SO SLOWLY THAT OTHER PEOPLE COULD HAVE NOTICED. OR THE OPPOSITE, BEING SO FIGETY OR RESTLESS THAT YOU HAVE BEEN MOVING AROUND A LOT MORE THAN USUAL: MORE THAN HALF THE DAYS
6. FEELING BAD ABOUT YOURSELF - OR THAT YOU ARE A FAILURE OR HAVE LET YOURSELF OR YOUR FAMILY DOWN: MORE THAN HALF THE DAYS

## 2024-10-23 NOTE — PATIENT INSTRUCTIONS
Isabella Tran ,    Thank you for coming in today. We at [facility] appreciate your trust in our care. If you have any questions or concerns about the care you received today, please do not hesitate to contact us at [phone].    The following instructions were discussed today:    - INCREASE losartan to 50 mg daily   - return in one week for nurse visit for blood pressure check   - get labs. For blood work: Nothing to eat or drink for at least 10 hours prior. Okay for water or black coffee.   - Follow up in 3 months.    - eye exams are recommended every 1-2 years for people without diabetes, and once yearly for people with diabetes  - dental exams are recommended every 6 months

## 2024-10-23 NOTE — PROGRESS NOTES
"Subjective   Patient ID: Isabella Tran \"Harris\" is a 24 y.o. adult who presents for Annual Exam (Physical today/Flu vaccine in office).    Well Adult Physical   Patient here for a comprehensive physical exam.    routine follow up. chronic issues as per assessment and plan.     Saw OB/GYN on 9/10/24. Note reviewed. This was for abnormal uterine bleeding.       Isabella reports his health as Fair.    Does the patient take any herbs or supplements that were not prescribed by a doctor? no   Is the patiet taking calcium supplements? no   Is the patient taking aspirin daily? no    Outpatient Medications Prior to Visit   Medication Sig Dispense Refill   • cetirizine (ZyrTEC) 10 mg tablet Take 1 tablet (10 mg) by mouth once daily. 90 tablet 1   • hyoscyamine (Anaspaz) 0.125 mg disintegrating tablet Take 1 tablet (0.125 mg) by mouth 4 times a day as needed (diarrhea). 360 tablet 1   • levothyroxine (Synthroid, Levoxyl) 100 mcg tablet Take 1 tablet (100 mcg) by mouth once daily. 90 tablet 1   • ondansetron ODT (Zofran-ODT) 4 mg disintegrating tablet Take 1 tablet (4 mg) by mouth every 8 hours if needed for nausea or vomiting. 20 tablet 0   • DULoxetine (Cymbalta) 60 mg DR capsule Take 2 capsules (120 mg) by mouth once daily at bedtime. 180 capsule 1   • hydrOXYzine pamoate (Vistaril) 25 mg capsule Take 1 capsule (25 mg) by mouth 4 times a day as needed for anxiety. 30 capsule    • losartan (Cozaar) 25 mg tablet Take 1 tablet (25 mg) by mouth once daily. 90 tablet 1   • metoclopramide (Reglan) 5 mg tablet Take 1 tablet (5 mg) by mouth 4 times a day. 360 tablet 1   • omeprazole (PriLOSEC) 20 mg DR capsule Take 1 capsule (20 mg) by mouth once daily in the morning. Take before meals. 90 capsule 1     No facility-administered medications prior to visit.       Social History     Socioeconomic History   • Marital status: Single   Tobacco Use   • Smoking status: Every Day     Current packs/day: 1.00     Types: Cigarettes   • " Smokeless tobacco: Never   Vaping Use   • Vaping status: Never Used   Substance and Sexual Activity   • Alcohol use: Not Currently   • Drug use: Yes     Types: Marijuana   • Sexual activity: Yes     Partners: Male, Female     Social Drivers of Health     Stress: No Stress Concern Present (9/10/2024)    [employer] - Occupational Stress Questionnaire    • Feeling of Stress : Not at all        History:  LMP: 10/11/24  Last pap date: 9/10/24  Abnormal pap? no  : 0  Para: 0    E-cigarette/Vaping       Questions Responses    E-cigarette/Vaping Use Never User            Last Dental Exam: 6 months or less    Last Eye Exam: more than 1 year    Diet: well balanced    Exercise: rarely    Health Maintenance Due   Topic Date Due   • HIV Screening  Never done   • Pneumococcal Vaccine: Pediatrics (0 to 5 Years) and At-Risk Patients (6 to 64 Years) (1 of 2 - PCV) Never done   • MMR Vaccines (1 of 1 - Standard series) Never done   • Varicella Vaccines (1 of 2 - 13+ 2-dose series) Never done   • HPV Vaccines (1 - 3-dose series) Never done   • Hepatitis C Screening  Never done   • Hepatitis B Vaccines (1 of 3 - 19+ 3-dose series) Never done   • Hepatitis A Vaccines (1 of 2 - Risk 2-dose series) Never done   • Zoster Vaccines (1 of 2) Never done   • DTaP/Tdap/Td Vaccines (1 - Tdap) 2021   • TSH Level  2022   • Diabetes Screening  2024   • COVID-19 Vaccine ( season) 2024            Review of Systems   Constitutional:  Positive for fatigue. Negative for chills, diaphoresis, fever and unexpected weight change.   HENT:  Negative for congestion, sinus pressure, sinus pain, sneezing and sore throat.    Respiratory:  Negative for cough, chest tightness, shortness of breath and wheezing.    Cardiovascular:  Negative for chest pain, palpitations and leg swelling.   Gastrointestinal:  Negative for abdominal pain, constipation, diarrhea, nausea and vomiting.   Endocrine:  "Negative for cold intolerance, heat intolerance, polydipsia, polyphagia and polyuria.   Genitourinary:  Negative for dysuria, frequency, hematuria and urgency.   Neurological:  Negative for dizziness, syncope, light-headedness, numbness and headaches.   Hematological:  Negative for adenopathy.   Psychiatric/Behavioral:  Positive for dysphoric mood. Negative for confusion. The patient is not nervous/anxious.          Objective   BP (!) 158/100 (BP Location: Right arm, Patient Position: Sitting, BP Cuff Size: Adult) Comment (BP Location): forearm  Pulse 86   Temp 36.9 °C (98.4 °F) (Oral)   Resp 16   Ht 1.727 m (5' 8\")   Wt (!) 163 kg (359 lb)   SpO2 95%   BMI 54.59 kg/m²     Physical Exam  Vitals and nursing note reviewed.   Constitutional:       General: He is not in acute distress.     Appearance: Normal appearance.   HENT:      Head: Normocephalic and atraumatic.      Nose: Nose normal.   Eyes:      Extraocular Movements: Extraocular movements intact.      Conjunctiva/sclera: Conjunctivae normal.      Pupils: Pupils are equal, round, and reactive to light.   Cardiovascular:      Rate and Rhythm: Normal rate and regular rhythm.      Heart sounds: No murmur heard.     No friction rub. No gallop.   Pulmonary:      Effort: Pulmonary effort is normal.      Breath sounds: Normal breath sounds. No wheezing, rhonchi or rales.   Abdominal:      General: Bowel sounds are normal. There is no distension.      Palpations: Abdomen is soft.      Tenderness: There is no abdominal tenderness.   Musculoskeletal:         General: Normal range of motion.      Cervical back: Normal range of motion and neck supple.   Skin:     General: Skin is warm and dry.   Neurological:      General: No focal deficit present.      Mental Status: He is alert and oriented to person, place, and time.      Deep Tendon Reflexes: Reflexes normal.   Psychiatric:         Mood and Affect: Mood normal.         Behavior: Behavior normal.         Thought " Content: Thought content normal.         Judgment: Judgment normal.         Assessment/Plan   Problem List Items Addressed This Visit             ICD-10-CM    Adult hypothyroidism E03.9     - continue levothyroxine  - check labs          Relevant Orders    Vitamin B12    CBC and Auto Differential    Comprehensive Metabolic Panel    TSH with reflex to Free T4 if abnormal    BMI 50.0-59.9, adult (Multi) Z68.43     Encouraged healthy lifestyle, including adequate exercise and high fiber, low fat and low carb diet.          Cigarette nicotine dependence without complication F17.210     - encouraged cessation         Class 3 severe obesity due to excess calories without serious comorbidity with body mass index (BMI) of 50.0 to 59.9 in adult E66.813, Z68.43, E66.01     Encouraged healthy lifestyle, including adequate exercise and high fiber, low fat and low carb diet.          Essential hypertension I10     - blood pressure elevated today. Has not taken losartan today   - INCREASE losartan to 50 mg daily   - return in one week for nurse visit for blood pressure check          Relevant Medications    losartan (Cozaar) 50 mg tablet    Other Relevant Orders    Vitamin B12    CBC and Auto Differential    Comprehensive Metabolic Panel    Gastroesophageal reflux disease without esophagitis K21.9     - stable. Continue omeprazole         Relevant Medications    omeprazole (PriLOSEC) 20 mg DR capsule    Other Relevant Orders    Vitamin B12    CBC and Auto Differential    Comprehensive Metabolic Panel    Gastroparesis K31.84     - stable. 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 Continue trazodone         Relevant Orders    Vitamin B12    CBC and Auto Differential    Comprehensive Metabolic Panel    PTSD (post-traumatic stress disorder) F43.10     - suboptimally controlled  - no longer seeing her psychiatrist as her psychiatrist left the practice.   - continue duloxetine  - recommended he schedule with psychiatry. He states that he can see a psychiatrist at the same practice he was at          Relevant Medications    DULoxetine (Cymbalta) 60 mg DR capsule    hydrOXYzine pamoate (Vistaril) 25 mg capsule    Other Relevant Orders    Vitamin B12    CBC and Auto Differential    Comprehensive Metabolic Panel    Seasonal allergies J30.2     - controlled. Continue cetirizine         Relevant Orders    Vitamin B12    CBC and Auto Differential    Comprehensive Metabolic Panel    Self-cutting of wrist (Multi) S61.519A, X78.9XXA     - suboptimally controlled  - no longer seeing her psychiatrist as her psychiatrist left the practice.   - continue duloxetine  - recommended he schedule with psychiatry. He states that he can see a psychiatrist at the same practice he was at          Severe anxiety F41.9     - suboptimally controlled  - no longer seeing her psychiatrist as her psychiatrist left the practice.   - continue duloxetine  - recommended he schedule with psychiatry. He states that he can see a psychiatrist at the same practice he was at          Relevant Medications    DULoxetine (Cymbalta) 60 mg DR capsule    hydrOXYzine pamoate (Vistaril) 25 mg capsule    Other Relevant Orders    Vitamin B12    CBC and Auto Differential    Comprehensive Metabolic Panel    Severe major depression (Multi) F32.2     - suboptimally controlled  - no longer seeing her psychiatrist as her  psychiatrist left the practice.   - continue duloxetine  - recommended he schedule with psychiatry. He states that he can see a psychiatrist at the same practice he was at          Relevant Medications    DULoxetine (Cymbalta) 60 mg DR capsule    hydrOXYzine pamoate (Vistaril) 25 mg capsule    Other Relevant Orders    Vitamin B12    CBC and Auto Differential    Comprehensive Metabolic Panel    Vitamin D deficiency E55.9     - check labs          Relevant Orders    Vitamin B12    Vitamin D, Total    CBC and Auto Differential    Comprehensive Metabolic Panel     Other Visit Diagnoses         Codes    Well adult exam    -  Primary Z00.00    Encounter for immunization     Z23    Relevant Orders    Flu vaccine, trivalent, preservative free, age 6 months and greater (Fluraix/Fluzone/Flulaval) (Completed)    Lipid screening     Z13.220    Relevant Orders    Lipid Panel    Encounter for screening for HIV     Z11.4    Relevant Orders    HIV 1/2 Antigen/Antibody Screen with Reflex to Confirmation    Need for hepatitis C screening test     Z11.59    Relevant Orders    Hepatitis C Antibody

## 2024-10-23 NOTE — ASSESSMENT & PLAN NOTE
- blood pressure elevated today. Has not taken losartan today   - INCREASE losartan to 50 mg daily   - return in one week for nurse visit for blood pressure check

## 2024-10-30 ENCOUNTER — TELEPHONE (OUTPATIENT)
Dept: PRIMARY CARE | Facility: CLINIC | Age: 24
End: 2024-10-30

## 2024-10-30 ENCOUNTER — APPOINTMENT (OUTPATIENT)
Dept: PRIMARY CARE | Facility: CLINIC | Age: 24
End: 2024-10-30

## 2024-10-30 DIAGNOSIS — I10 ESSENTIAL HYPERTENSION: ICD-10-CM

## 2024-10-30 RX ORDER — LOSARTAN POTASSIUM 25 MG/1
25 TABLET ORAL DAILY
COMMUNITY
Start: 2024-10-30 | End: 2024-10-30 | Stop reason: SDUPTHER

## 2024-10-30 RX ORDER — LOSARTAN POTASSIUM 25 MG/1
25 TABLET ORAL DAILY
Qty: 90 TABLET | Refills: 1 | Status: SHIPPED | OUTPATIENT
Start: 2024-10-30 | End: 2025-04-28

## 2025-02-03 ENCOUNTER — APPOINTMENT (OUTPATIENT)
Dept: PRIMARY CARE | Facility: CLINIC | Age: 25
End: 2025-02-03
Payer: COMMERCIAL

## 2025-02-03 VITALS
DIASTOLIC BLOOD PRESSURE: 82 MMHG | RESPIRATION RATE: 16 BRPM | WEIGHT: 293 LBS | SYSTOLIC BLOOD PRESSURE: 120 MMHG | BODY MASS INDEX: 44.41 KG/M2 | HEART RATE: 86 BPM | OXYGEN SATURATION: 95 % | HEIGHT: 68 IN

## 2025-02-03 DIAGNOSIS — F41.9 SEVERE ANXIETY: ICD-10-CM
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
## 2025-02-03 DIAGNOSIS — E66.813 CLASS 3 SEVERE OBESITY DUE TO EXCESS CALORIES WITHOUT SERIOUS COMORBIDITY WITH BODY MASS INDEX (BMI) OF 50.0 TO 59.9 IN ADULT: ICD-10-CM

## 2025-02-03 DIAGNOSIS — Z11.59 NEED FOR HEPATITIS C SCREENING TEST: ICD-10-CM

## 2025-02-03 DIAGNOSIS — J30.2 SEASONAL ALLERGIES: ICD-10-CM

## 2025-02-03 DIAGNOSIS — I10 ESSENTIAL HYPERTENSION: ICD-10-CM

## 2025-02-03 DIAGNOSIS — Z23 ENCOUNTER FOR IMMUNIZATION: ICD-10-CM

## 2025-02-03 DIAGNOSIS — Z12.4 PAP SMEAR FOR CERVICAL CANCER SCREENING: ICD-10-CM

## 2025-02-03 DIAGNOSIS — E66.01 CLASS 3 SEVERE OBESITY DUE TO EXCESS CALORIES WITHOUT SERIOUS COMORBIDITY WITH BODY MASS INDEX (BMI) OF 50.0 TO 59.9 IN ADULT: ICD-10-CM

## 2025-02-03 DIAGNOSIS — K58.0 IRRITABLE BOWEL SYNDROME WITH DIARRHEA: ICD-10-CM

## 2025-02-03 DIAGNOSIS — Z13.220 LIPID SCREENING: ICD-10-CM

## 2025-02-03 DIAGNOSIS — K31.84 GASTROPARESIS: ICD-10-CM

## 2025-02-03 DIAGNOSIS — Z11.4 ENCOUNTER FOR SCREENING FOR HIV: ICD-10-CM

## 2025-02-03 DIAGNOSIS — F43.10 PTSD (POST-TRAUMATIC STRESS DISORDER): ICD-10-CM

## 2025-02-03 DIAGNOSIS — F17.210 CIGARETTE NICOTINE DEPENDENCE WITHOUT COMPLICATION: ICD-10-CM

## 2025-02-03 DIAGNOSIS — M79.89 BILATERAL SWELLING OF FEET: ICD-10-CM

## 2025-02-03 DIAGNOSIS — K21.9 GASTROESOPHAGEAL REFLUX DISEASE WITHOUT ESOPHAGITIS: ICD-10-CM

## 2025-02-03 DIAGNOSIS — D80.2 IGA DEFICIENCY (MULTI): ICD-10-CM

## 2025-02-03 DIAGNOSIS — E55.9 VITAMIN D DEFICIENCY: ICD-10-CM

## 2025-02-03 DIAGNOSIS — M79.89 SWELLING OF BOTH HANDS: ICD-10-CM

## 2025-02-03 DIAGNOSIS — F32.2 SEVERE MAJOR DEPRESSION (MULTI): Primary | ICD-10-CM

## 2025-02-03 DIAGNOSIS — E03.9 ADULT HYPOTHYROIDISM: ICD-10-CM

## 2025-02-03 DIAGNOSIS — S61.519A SELF-CUTTING OF WRIST (MULTI): ICD-10-CM

## 2025-02-03 PROBLEM — L30.4 INTERTRIGO: Status: RESOLVED | Noted: 2023-01-30 | Resolved: 2025-02-03

## 2025-02-03 PROBLEM — R11.0 NAUSEA IN ADULT: Status: RESOLVED | Noted: 2023-01-30 | Resolved: 2025-02-03

## 2025-02-03 PROCEDURE — 4004F PT TOBACCO SCREEN RCVD TLK: CPT | Performed by: FAMILY MEDICINE

## 2025-02-03 PROCEDURE — 3074F SYST BP LT 130 MM HG: CPT | Performed by: FAMILY MEDICINE

## 2025-02-03 PROCEDURE — 3008F BODY MASS INDEX DOCD: CPT | Performed by: FAMILY MEDICINE

## 2025-02-03 PROCEDURE — 3079F DIAST BP 80-89 MM HG: CPT | Performed by: FAMILY MEDICINE

## 2025-02-03 PROCEDURE — 90471 IMMUNIZATION ADMIN: CPT | Performed by: FAMILY MEDICINE

## 2025-02-03 PROCEDURE — 90677 PCV20 VACCINE IM: CPT | Performed by: FAMILY MEDICINE

## 2025-02-03 PROCEDURE — 99214 OFFICE O/P EST MOD 30 MIN: CPT | Performed by: FAMILY MEDICINE

## 2025-02-03 RX ORDER — OMEPRAZOLE 20 MG/1
20 CAPSULE, DELAYED RELEASE ORAL
Qty: 90 CAPSULE | Refills: 1 | Status: SHIPPED | OUTPATIENT
Start: 2025-02-03

## 2025-02-03 RX ORDER — LEVOTHYROXINE SODIUM 100 UG/1
100 TABLET ORAL DAILY
Qty: 90 TABLET | Refills: 1 | Status: SHIPPED | OUTPATIENT
Start: 2025-02-03 | End: 2025-08-02

## 2025-02-03 RX ORDER — LOSARTAN POTASSIUM 25 MG/1
25 TABLET ORAL DAILY
Qty: 90 TABLET | Refills: 1 | Status: SHIPPED | OUTPATIENT
Start: 2025-02-03 | End: 2025-08-02

## 2025-02-03 RX ORDER — METOCLOPRAMIDE 5 MG/1
5 TABLET ORAL 4 TIMES DAILY
Qty: 360 TABLET | Refills: 1 | Status: SHIPPED | OUTPATIENT
Start: 2025-02-03

## 2025-02-03 RX ORDER — HYOSCYAMINE SULFATE 0.12 MG/1
0.12 TABLET, ORALLY DISINTEGRATING ORAL 4 TIMES DAILY PRN
Qty: 360 TABLET | Refills: 1 | Status: SHIPPED | OUTPATIENT
Start: 2025-02-03 | End: 2025-08-02

## 2025-02-03 RX ORDER — DULOXETIN HYDROCHLORIDE 60 MG/1
120 CAPSULE, DELAYED RELEASE ORAL NIGHTLY
Qty: 180 CAPSULE | Refills: 1 | Status: SHIPPED | OUTPATIENT
Start: 2025-02-03

## 2025-02-03 ASSESSMENT — ENCOUNTER SYMPTOMS
DYSURIA: 0
CONFUSION: 0
ADENOPATHY: 0
POLYDIPSIA: 0
LIGHT-HEADEDNESS: 0
ABDOMINAL PAIN: 0
HEADACHES: 0
FREQUENCY: 0
SORE THROAT: 0
UNEXPECTED WEIGHT CHANGE: 0
NUMBNESS: 0
DIAPHORESIS: 0
SINUS PAIN: 0
DIARRHEA: 0
HEMATURIA: 0
COUGH: 0
NERVOUS/ANXIOUS: 0
FEVER: 0
DIZZINESS: 0
CHILLS: 0
DYSPHORIC MOOD: 0
VOMITING: 0
SINUS PRESSURE: 0
SHORTNESS OF BREATH: 0
CONSTIPATION: 0
PALPITATIONS: 0
WHEEZING: 0
NAUSEA: 0
CHEST TIGHTNESS: 0
POLYPHAGIA: 0

## 2025-02-03 ASSESSMENT — PATIENT HEALTH QUESTIONNAIRE - PHQ9
SUM OF ALL RESPONSES TO PHQ QUESTIONS 1-9: 21
7. TROUBLE CONCENTRATING ON THINGS, SUCH AS READING THE NEWSPAPER OR WATCHING TELEVISION: MORE THAN HALF THE DAYS
1. LITTLE INTEREST OR PLEASURE IN DOING THINGS: NEARLY EVERY DAY
2. FEELING DOWN, DEPRESSED OR HOPELESS: MORE THAN HALF THE DAYS
4. FEELING TIRED OR HAVING LITTLE ENERGY: NEARLY EVERY DAY
3. TROUBLE FALLING OR STAYING ASLEEP: NEARLY EVERY DAY
9. THOUGHTS THAT YOU WOULD BE BETTER OFF DEAD, OR OF HURTING YOURSELF: SEVERAL DAYS
10. IF YOU CHECKED OFF ANY PROBLEMS, HOW DIFFICULT HAVE THESE PROBLEMS MADE IT FOR YOU TO DO YOUR WORK, TAKE CARE OF THINGS AT HOME, OR GET ALONG WITH OTHER PEOPLE: EXTREMELY DIFFICULT
SUM OF ALL RESPONSES TO PHQ9 QUESTIONS 1 & 2: 5
6. FEELING BAD ABOUT YOURSELF - OR THAT YOU ARE A FAILURE OR HAVE LET YOURSELF OR YOUR FAMILY DOWN: NEARLY EVERY DAY
8. MOVING OR SPEAKING SO SLOWLY THAT OTHER PEOPLE COULD HAVE NOTICED. OR THE OPPOSITE, BEING SO FIGETY OR RESTLESS THAT YOU HAVE BEEN MOVING AROUND A LOT MORE THAN USUAL: SEVERAL DAYS
5. POOR APPETITE OR OVEREATING: NEARLY EVERY DAY

## 2025-02-03 ASSESSMENT — ANXIETY QUESTIONNAIRES
3. WORRYING TOO MUCH ABOUT DIFFERENT THINGS: MORE THAN HALF THE DAYS
7. FEELING AFRAID AS IF SOMETHING AWFUL MIGHT HAPPEN: NEARLY EVERY DAY
6. BECOMING EASILY ANNOYED OR IRRITABLE: MORE THAN HALF THE DAYS
5. BEING SO RESTLESS THAT IT IS HARD TO SIT STILL: MORE THAN HALF THE DAYS
2. NOT BEING ABLE TO STOP OR CONTROL WORRYING: MORE THAN HALF THE DAYS
1. FEELING NERVOUS, ANXIOUS, OR ON EDGE: NEARLY EVERY DAY
GAD7 TOTAL SCORE: 16
IF YOU CHECKED OFF ANY PROBLEMS ON THIS QUESTIONNAIRE, HOW DIFFICULT HAVE THESE PROBLEMS MADE IT FOR YOU TO DO YOUR WORK, TAKE CARE OF THINGS AT HOME, OR GET ALONG WITH OTHER PEOPLE: VERY DIFFICULT
4. TROUBLE RELAXING: MORE THAN HALF THE DAYS

## 2025-02-03 NOTE — ASSESSMENT & PLAN NOTE
- used to happen a lot in childhood then seemed to go away on its own  - had another episode of this about 1 month ago but none since then  - did have improvement with benadryl, which seems to lean towards this having an allergic component  - will start with labs

## 2025-02-03 NOTE — ASSESSMENT & PLAN NOTE
- Continue duloxetine   - is seeing therapist at [place]. Would like to see a psychiatrist. Plans to ask at [place]

## 2025-02-03 NOTE — PATIENT INSTRUCTIONS
Isabella Tran ,    Thank you for coming in today. We at [facility] appreciate your trust in our care. If you have any questions or concerns about the care you received today, please do not hesitate to contact us at [phone].    The following instructions were discussed today:    - get labs. For blood work: Nothing to eat or drink for at least 10 hours prior. Okay for water or black coffee.   - Follow up in 3 months.    - I recommend the following vaccines at the pharmacy: Tdap, COVID-19

## 2025-02-03 NOTE — ASSESSMENT & PLAN NOTE
- Continue duloxetine   - is seeing therapist at [place]. Would like to see a psychiatrist. Plans to ask at [place]

## 2025-02-03 NOTE — PROGRESS NOTES
"Subjective   Patient ID: Harris Tran is a 24 y.o. adult who presents for follow up (Prevnar 20 today in office/Hands swell and get really itchy, randomly).    HPI   routine follow up. chronic issues as per assessment and plan.     Complains of occasional hand swelling and pain. States it used to happen a lot when she was young and seemed to go away. Then about a month ago started acting up again. Will get the same issue in her feet. Has only happened once since she was a child.     Review of Systems   Constitutional:  Negative for chills, diaphoresis, fever and unexpected weight change.   HENT:  Negative for congestion, sinus pressure, sinus pain, sneezing and sore throat.    Respiratory:  Negative for cough, chest tightness, shortness of breath and wheezing.    Cardiovascular:  Negative for chest pain, palpitations and leg swelling.   Gastrointestinal:  Negative for abdominal pain, constipation, diarrhea, nausea and vomiting.   Endocrine: Negative for cold intolerance, heat intolerance, polydipsia, polyphagia and polyuria.   Genitourinary:  Negative for dysuria, frequency, hematuria and urgency.   Neurological:  Negative for dizziness, syncope, light-headedness, numbness and headaches.   Hematological:  Negative for adenopathy.   Psychiatric/Behavioral:  Negative for confusion and dysphoric mood. The patient is not nervous/anxious.        Objective   /82 (BP Location: Right arm, Patient Position: Sitting, BP Cuff Size: Adult) Comment (BP Location): forearm  Pulse 86   Resp 16   Ht 1.727 m (5' 8\")   Wt (!) 161 kg (355 lb)   SpO2 95%   BMI 53.98 kg/m²     Physical Exam  Vitals and nursing note reviewed.   Constitutional:       General: He is not in acute distress.     Appearance: Normal appearance.   HENT:      Head: Normocephalic and atraumatic.      Nose: Nose normal.   Eyes:      Extraocular Movements: Extraocular movements intact.      Conjunctiva/sclera: Conjunctivae normal.      Pupils: Pupils " are equal, round, and reactive to light.   Cardiovascular:      Rate and Rhythm: Normal rate and regular rhythm.      Heart sounds: No murmur heard.     No friction rub. No gallop.   Pulmonary:      Effort: Pulmonary effort is normal.      Breath sounds: Normal breath sounds. No wheezing, rhonchi or rales.   Abdominal:      General: Bowel sounds are normal. There is no distension.      Palpations: Abdomen is soft.      Tenderness: There is no abdominal tenderness.   Musculoskeletal:         General: Normal range of motion.      Cervical back: Normal range of motion and neck supple.   Skin:     General: Skin is warm and dry.   Neurological:      General: No focal deficit present.      Mental Status: He is alert and oriented to person, place, and time.      Deep Tendon Reflexes: Reflexes normal.   Psychiatric:         Mood and Affect: Mood normal.         Behavior: Behavior normal.         Thought Content: Thought content normal.         Judgment: Judgment normal.         Assessment/Plan   Problem List Items Addressed This Visit             ICD-10-CM    Adult hypothyroidism E03.9     - continue levothyroxine  - check labs          Relevant Medications    levothyroxine (Synthroid, Levoxyl) 100 mcg tablet    Other Relevant Orders    Vitamin B12    CBC and Auto Differential    Comprehensive Metabolic Panel    TSH with reflex to Free T4 if abnormal    Bilateral swelling of feet M79.89     - used to happen a lot in childhood then seemed to go away on its own  - had another episode of this about 1 month ago but none since then  - did have improvement with benadryl, which seems to lean towards this having an allergic component  - will start with labs          Relevant Orders    BROWN without Reflex ROSEY    C-Reactive Protein    Sedimentation Rate    Rheumatoid Factor    Citrulline Antibody, IgG    BMI 50.0-59.9, adult (Multi) Z68.43     Encouraged healthy lifestyle, including adequate exercise and high fiber, low fat and low  carb diet.          Cigarette nicotine dependence without complication F17.210     - encouraged cessation         Class 3 severe obesity due to excess calories without serious comorbidity with body mass index (BMI) of 50.0 to 59.9 in adult E66.813, Z68.43, E66.01     Encouraged healthy lifestyle, including adequate exercise and high fiber, low fat and low carb diet.          Encounter for immunization Z23     - PCV 20 administered today   - recommended the following vaccines at the pharmacy: Tdap, COVID-19         Relevant Orders    Pneumococcal conjugate vaccine, 20-valent (PREVNAR 20) (Completed)    Essential hypertension I10     - controlled   - current regimen: losartan 25 mg daily          Relevant Medications    losartan (Cozaar) 25 mg tablet    Other Relevant Orders    Vitamin B12    CBC and Auto Differential    Comprehensive Metabolic Panel    Gastroesophageal reflux disease without esophagitis K21.9     - stable. Continue omeprazole         Relevant Medications    omeprazole (PriLOSEC) 20 mg DR capsule    Other Relevant Orders    Vitamin B12    CBC and Auto Differential    Comprehensive Metabolic Panel    Gastroparesis K31.84     - stable. 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 continue levsin         Relevant Medications    hyoscyamine (Anaspaz) 0.125 mg disintegrating tablet    Other Relevant Orders    Vitamin B12    CBC and Auto Differential    Comprehensive Metabolic Panel    Pap smear for cervical cancer screening Z12.4     - normal pap 9/2024; repeat 9/2027         PTSD  (post-traumatic stress disorder) F43.10     - Continue duloxetine   - is seeing therapist at [place]. Would like to see a psychiatrist. Plans to ask at [place]         Relevant Medications    DULoxetine (Cymbalta) 60 mg DR capsule    Other Relevant Orders    Vitamin B12    CBC and Auto Differential    Comprehensive Metabolic Panel    Seasonal allergies J30.2     - controlled. Continue cetirizine         Relevant Orders    Vitamin B12    CBC and Auto Differential    Comprehensive Metabolic Panel    Self-cutting of wrist (Multi) S61.519A, X78.9XXA     - controlled. Continue duloxetine          Severe anxiety F41.9     - Continue duloxetine   - is seeing therapist at [place]. Would like to see a psychiatrist. Plans to ask at [place]         Relevant Medications    DULoxetine (Cymbalta) 60 mg DR capsule    Other Relevant Orders    Vitamin B12    CBC and Auto Differential    Comprehensive Metabolic Panel    Severe major depression (Multi) - Primary F32.2     - Continue duloxetine   - is seeing therapist at [place]. Would like to see a psychiatrist. Plans to ask at [place]         Relevant Medications    DULoxetine (Cymbalta) 60 mg DR capsule    Other Relevant Orders    Vitamin B12    CBC and Auto Differential    Comprehensive Metabolic Panel    Swelling of both hands M79.89     - used to happen a lot in childhood then seemed to go away on its own  - had another episode of this about 1 month ago but none since then  - did have improvement with benadryl, which seems to lean towards this having an allergic component  - will start with labs          Relevant Orders    BROWN without Reflex ROSEY    C-Reactive Protein    Sedimentation Rate    Rheumatoid Factor    Citrulline Antibody, IgG    Vitamin D deficiency E55.9     - check labs          Relevant Orders    Vitamin B12    Vitamin D, Total    CBC and Auto Differential    Comprehensive Metabolic Panel     Other Visit Diagnoses          Codes    Lipid screening     Z13.220    Relevant Orders    Lipid Panel    Encounter for screening for HIV     Z11.4    Relevant Orders    HIV 1/2 Antigen/Antibody Screen with Reflex to Confirmation    Need for hepatitis C screening test     Z11.59    Relevant Orders    Hepatitis C Antibody

## 2025-02-03 NOTE — ASSESSMENT & PLAN NOTE
- Continue duloxetine   - is seeing therapist at [place]. Would like to see a psychiatrist. Plans to ask at [facility]

## 2025-02-03 NOTE — ASSESSMENT & PLAN NOTE
- PCV 20 administered today   - recommended the following vaccines at the pharmacy: Tdap, COVID-19

## 2025-02-12 ENCOUNTER — TELEPHONE (OUTPATIENT)
Dept: PRIMARY CARE | Facility: CLINIC | Age: 25
End: 2025-02-12
Payer: MEDICAID

## 2025-02-17 ENCOUNTER — DOCUMENTATION (OUTPATIENT)
Dept: PRIMARY CARE | Facility: CLINIC | Age: 25
End: 2025-02-17
Payer: MEDICAID

## 2025-02-18 NOTE — PROGRESS NOTES
Isabella Tran    24 y.o. nonbinary   YOB: 2000    Patient Active Problem List   Diagnosis    Cigarette nicotine dependence without complication    Gastroparesis    Gastroesophageal reflux disease without esophagitis    Adult hypothyroidism    IgA deficiency (Multi)    Psychophysiological insomnia    Marijuana use    Migraine without aura and without status migrainosus, not intractable    PTSD (post-traumatic stress disorder)    Seasonal allergies    Self-cutting of wrist (Multi)    Severe anxiety    Severe major depression (Multi)    Vitamin D deficiency    BMI 50.0-59.9, adult (Multi)    Class 3 severe obesity due to excess calories without serious comorbidity with body mass index (BMI) of 50.0 to 59.9 in adult    Irritable bowel syndrome with diarrhea    Essential hypertension    Abnormal uterine bleeding    Pap smear for cervical cancer screening    Encounter for immunization    Swelling of both hands    Bilateral swelling of feet       Outpatient Encounter Medications as of 2/17/2025   Medication Sig Dispense Refill    cetirizine (ZyrTEC) 10 mg tablet Take 1 tablet (10 mg) by mouth once daily. 90 tablet 1    DULoxetine (Cymbalta) 60 mg DR capsule Take 2 capsules (120 mg) by mouth once daily at bedtime. 180 capsule 1    hydrOXYzine pamoate (Vistaril) 25 mg capsule Take 1 capsule (25 mg) by mouth 4 times a day as needed for anxiety. 120 capsule 0    hyoscyamine (Anaspaz) 0.125 mg disintegrating tablet Dissolve 1 tablet (0.125 mg) in the mouth 4 times a day as needed (diarrhea). 360 tablet 1    levothyroxine (Synthroid, Levoxyl) 100 mcg tablet Take 1 tablet (100 mcg) by mouth once daily. 90 tablet 1    losartan (Cozaar) 25 mg tablet Take 1 tablet (25 mg) by mouth once daily. 90 tablet 1    metoclopramide (Reglan) 5 mg tablet Take 1 tablet (5 mg) by mouth 4 times a day. 360 tablet 1    omeprazole (PriLOSEC) 20 mg DR capsule Take 1 capsule (20 mg) by mouth once daily in the morning. Take before  meals. 90 capsule 1    ondansetron ODT (Zofran-ODT) 4 mg disintegrating tablet Take 1 tablet (4 mg) by mouth every 8 hours if needed for nausea or vomiting. 20 tablet 0     No facility-administered encounter medications on file as of 2/17/2025.       Allergies as of 02/17/2025 - Reviewed 02/03/2025   Allergen Reaction Noted    Lisinopril Cough 05/31/2023       Past Medical History:   Diagnosis Date    Displaced fracture of fifth metatarsal bone, left foot, subsequent encounter for fracture with malunion 06/25/2021    Closed displaced fracture of fifth metatarsal bone of left foot with malunion, subsequent encounter    HTN (hypertension)     Hypothyroidism     IgA deficiency (Multi)     Other symptoms and signs involving the musculoskeletal system 07/02/2020    Ankle weakness    Personal history of other diseases of the digestive system 08/11/2021    History of rectal bleeding    Suicide attempt, initial encounter (Multi)     Suicidal behavior with attempted self-injury       Past Surgical History:   Procedure Laterality Date    OTHER SURGICAL HISTORY  06/17/2021    Tonsillectomy with adenoidectomy    WISDOM TOOTH EXTRACTION         Family History   Problem Relation Name Age of Onset    Hypertension Mother      Diabetes Mother          type II    Hyperlipidemia Mother      Mental illness Mother      Other (premature ventricular) Mother      Coronary artery disease Mother      COPD Father      Alcohol abuse Father      Sleep apnea Father      Atrial fibrillation Father      Cancer Other Grandparent     Diabetes Other Grandparent        Social History     Socioeconomic History    Marital status: Single     Spouse name: Not on file    Number of children: Not on file    Years of education: Not on file    Highest education level: Not on file   Occupational History    Not on file   Tobacco Use    Smoking status: Every Day     Current packs/day: 1.00     Average packs/day: 1 pack/day for 8.1 years (8.1 ttl pk-yrs)      Types: Cigarettes     Start date: 2017    Smokeless tobacco: Never   Vaping Use    Vaping status: Never Used   Substance and Sexual Activity    Alcohol use: Not Currently    Drug use: Yes     Types: Marijuana    Sexual activity: Yes     Partners: Male, Female   Other Topics Concern    Not on file   Social History Narrative    Not on file     Social Drivers of Health     Financial Resource Strain: Not on file   Food Insecurity: Not on file   Transportation Needs: Not on file   Physical Activity: Not on file   Stress: No Stress Concern Present (9/10/2024)    [employer] - Occupational Stress Questionnaire     Feeling of Stress : Not at all   Social Connections: Not on file   Intimate Partner Violence: Not on file   Housing Stability: Not on file

## 2025-05-14 ENCOUNTER — APPOINTMENT (OUTPATIENT)
Dept: PRIMARY CARE | Facility: CLINIC | Age: 25
End: 2025-05-14
Payer: MEDICAID

## 2025-05-26 NOTE — TELEPHONE ENCOUNTER
Form complete and in my outbox   
Ivory brought in paper. She has extra time to get in so she said just to call her once paper filled out. It is in your inbox for signing.  
Ivory says Harris received paper 2/12/25 that needs filled out for Harris to receive food stamps. He has been on for 3-4 months but now they are requesting this by 2/18/25 in order for him to continue to receive. I let her know tomorrow when she brings the form in, is your first day back from vacation. I suggested she reach out to see if they can extend due date. Do you believe we can have back to her by 2/17/25?  
Spoke with Harris, I will fax form, copy and mail to patient, scan to chart.  
Yes, I can do that on 2/17/25 in the morning.  
no

## 2025-07-25 ENCOUNTER — HOSPITAL ENCOUNTER (EMERGENCY)
Facility: HOSPITAL | Age: 25
Discharge: HOME | End: 2025-07-25
Attending: STUDENT IN AN ORGANIZED HEALTH CARE EDUCATION/TRAINING PROGRAM
Payer: MEDICAID

## 2025-07-25 ENCOUNTER — APPOINTMENT (OUTPATIENT)
Dept: RADIOLOGY | Facility: HOSPITAL | Age: 25
End: 2025-07-25
Payer: MEDICAID

## 2025-07-25 VITALS
DIASTOLIC BLOOD PRESSURE: 81 MMHG | RESPIRATION RATE: 18 BRPM | TEMPERATURE: 97.9 F | OXYGEN SATURATION: 96 % | HEIGHT: 69 IN | WEIGHT: 293 LBS | BODY MASS INDEX: 43.4 KG/M2 | HEART RATE: 73 BPM | SYSTOLIC BLOOD PRESSURE: 147 MMHG

## 2025-07-25 DIAGNOSIS — T50.901A ACCIDENTAL DRUG OVERDOSE, INITIAL ENCOUNTER: Primary | ICD-10-CM

## 2025-07-25 LAB
ALBUMIN SERPL BCP-MCNC: 4.2 G/DL (ref 3.4–5)
ALP SERPL-CCNC: 56 U/L (ref 33–120)
ALT SERPL W P-5'-P-CCNC: 14 U/L (ref 7–52)
AMORPH CRY #/AREA UR COMP ASSIST: ABNORMAL /HPF
ANION GAP BLDV CALCULATED.4IONS-SCNC: 11 MMOL/L (ref 10–25)
ANION GAP SERPL CALC-SCNC: 11 MMOL/L (ref 10–20)
APPEARANCE UR: ABNORMAL
AST SERPL W P-5'-P-CCNC: 12 U/L (ref 9–39)
BACTERIA #/AREA URNS AUTO: ABNORMAL /HPF
BASE EXCESS BLDV CALC-SCNC: 0 MMOL/L (ref -2–3)
BASOPHILS # BLD AUTO: 0.04 X10*3/UL (ref 0–0.1)
BASOPHILS NFR BLD AUTO: 0.3 %
BILIRUB SERPL-MCNC: 0.5 MG/DL (ref 0–1.2)
BILIRUB UR STRIP.AUTO-MCNC: NEGATIVE MG/DL
BODY TEMPERATURE: 37 DEGREES CELSIUS
BUN SERPL-MCNC: 12 MG/DL (ref 6–23)
CA-I BLDV-SCNC: 1.22 MMOL/L (ref 1.1–1.33)
CALCIUM SERPL-MCNC: 9 MG/DL (ref 8.6–10.3)
CARDIAC TROPONIN I PNL SERPL HS: 4 NG/L (ref 0–20)
CHLORIDE BLDV-SCNC: 105 MMOL/L (ref 98–107)
CHLORIDE SERPL-SCNC: 107 MMOL/L (ref 98–107)
CO2 SERPL-SCNC: 22 MMOL/L (ref 21–32)
COLOR UR: ABNORMAL
CREAT SERPL-MCNC: 0.75 MG/DL (ref 0.5–1.3)
EGFRCR SERPLBLD CKD-EPI 2021: >90 ML/MIN/1.73M*2
EOSINOPHIL # BLD AUTO: 0.06 X10*3/UL (ref 0–0.7)
EOSINOPHIL NFR BLD AUTO: 0.5 %
ERYTHROCYTE [DISTWIDTH] IN BLOOD BY AUTOMATED COUNT: 13.8 % (ref 11.5–14.5)
GLUCOSE BLDV-MCNC: 118 MG/DL (ref 74–99)
GLUCOSE SERPL-MCNC: 112 MG/DL (ref 74–99)
GLUCOSE UR STRIP.AUTO-MCNC: NORMAL MG/DL
HCO3 BLDV-SCNC: 24.8 MMOL/L (ref 22–26)
HCT VFR BLD AUTO: 41.3 % (ref 36–52)
HCT VFR BLD EST: 43 % (ref 36–52)
HGB BLD-MCNC: 13.7 G/DL (ref 12–17.5)
HGB BLDV-MCNC: 14.2 G/DL (ref 12–17.5)
IMM GRANULOCYTES # BLD AUTO: 0.04 X10*3/UL (ref 0–0.7)
IMM GRANULOCYTES NFR BLD AUTO: 0.3 % (ref 0–0.9)
INHALED O2 CONCENTRATION: 21 %
KETONES UR STRIP.AUTO-MCNC: NEGATIVE MG/DL
LACTATE BLDV-SCNC: 0.9 MMOL/L (ref 0.4–2)
LEUKOCYTE ESTERASE UR QL STRIP.AUTO: ABNORMAL
LYMPHOCYTES # BLD AUTO: 0.52 X10*3/UL (ref 1.2–4.8)
LYMPHOCYTES NFR BLD AUTO: 4.2 %
MAGNESIUM SERPL-MCNC: 1.96 MG/DL (ref 1.6–2.4)
MCH RBC QN AUTO: 27.1 PG (ref 26–34)
MCHC RBC AUTO-ENTMCNC: 33.2 G/DL (ref 32–36)
MCV RBC AUTO: 82 FL (ref 80–100)
MONOCYTES # BLD AUTO: 0.42 X10*3/UL (ref 0.1–1)
MONOCYTES NFR BLD AUTO: 3.4 %
MUCOUS THREADS #/AREA URNS AUTO: ABNORMAL /LPF
NEUTROPHILS # BLD AUTO: 11.34 X10*3/UL (ref 1.2–7.7)
NEUTROPHILS NFR BLD AUTO: 91.3 %
NITRITE UR QL STRIP.AUTO: NEGATIVE
NRBC BLD-RTO: 0 /100 WBCS (ref 0–0)
OXYHGB MFR BLDV: 88.3 % (ref 45–75)
PCO2 BLDV: 40 MM HG (ref 41–51)
PH BLDV: 7.4 PH (ref 7.33–7.43)
PH UR STRIP.AUTO: 5.5 [PH]
PLATELET # BLD AUTO: 368 X10*3/UL (ref 150–450)
PO2 BLDV: 65 MM HG (ref 35–45)
POTASSIUM BLDV-SCNC: 4.1 MMOL/L (ref 3.5–5.3)
POTASSIUM SERPL-SCNC: 3.8 MMOL/L (ref 3.5–5.3)
PROT SERPL-MCNC: 7 G/DL (ref 6.4–8.2)
PROT UR STRIP.AUTO-MCNC: NEGATIVE MG/DL
RBC # BLD AUTO: 5.06 X10*6/UL (ref 4–5.9)
RBC # UR STRIP.AUTO: ABNORMAL MG/DL
RBC #/AREA URNS AUTO: ABNORMAL /HPF
SAO2 % BLDV: 93 % (ref 45–75)
SODIUM BLDV-SCNC: 137 MMOL/L (ref 136–145)
SODIUM SERPL-SCNC: 136 MMOL/L (ref 136–145)
SP GR UR STRIP.AUTO: 1.01
SQUAMOUS #/AREA URNS AUTO: ABNORMAL /HPF
TSH SERPL-ACNC: 1.75 MIU/L (ref 0.44–3.98)
UROBILINOGEN UR STRIP.AUTO-MCNC: NORMAL MG/DL
WBC # BLD AUTO: 12.4 X10*3/UL (ref 4.4–11.3)
WBC #/AREA URNS AUTO: ABNORMAL /HPF

## 2025-07-25 PROCEDURE — 85025 COMPLETE CBC W/AUTO DIFF WBC: CPT | Performed by: STUDENT IN AN ORGANIZED HEALTH CARE EDUCATION/TRAINING PROGRAM

## 2025-07-25 PROCEDURE — 84132 ASSAY OF SERUM POTASSIUM: CPT | Performed by: STUDENT IN AN ORGANIZED HEALTH CARE EDUCATION/TRAINING PROGRAM

## 2025-07-25 PROCEDURE — 96374 THER/PROPH/DIAG INJ IV PUSH: CPT

## 2025-07-25 PROCEDURE — 84443 ASSAY THYROID STIM HORMONE: CPT | Performed by: STUDENT IN AN ORGANIZED HEALTH CARE EDUCATION/TRAINING PROGRAM

## 2025-07-25 PROCEDURE — 84484 ASSAY OF TROPONIN QUANT: CPT | Performed by: STUDENT IN AN ORGANIZED HEALTH CARE EDUCATION/TRAINING PROGRAM

## 2025-07-25 PROCEDURE — 2500000001 HC RX 250 WO HCPCS SELF ADMINISTERED DRUGS (ALT 637 FOR MEDICARE OP): Performed by: STUDENT IN AN ORGANIZED HEALTH CARE EDUCATION/TRAINING PROGRAM

## 2025-07-25 PROCEDURE — 36415 COLL VENOUS BLD VENIPUNCTURE: CPT | Performed by: STUDENT IN AN ORGANIZED HEALTH CARE EDUCATION/TRAINING PROGRAM

## 2025-07-25 PROCEDURE — 71046 X-RAY EXAM CHEST 2 VIEWS: CPT

## 2025-07-25 PROCEDURE — 83735 ASSAY OF MAGNESIUM: CPT | Performed by: STUDENT IN AN ORGANIZED HEALTH CARE EDUCATION/TRAINING PROGRAM

## 2025-07-25 PROCEDURE — 87086 URINE CULTURE/COLONY COUNT: CPT | Mod: PORLAB | Performed by: STUDENT IN AN ORGANIZED HEALTH CARE EDUCATION/TRAINING PROGRAM

## 2025-07-25 PROCEDURE — 99284 EMERGENCY DEPT VISIT MOD MDM: CPT | Mod: 25 | Performed by: STUDENT IN AN ORGANIZED HEALTH CARE EDUCATION/TRAINING PROGRAM

## 2025-07-25 PROCEDURE — 81001 URINALYSIS AUTO W/SCOPE: CPT | Performed by: STUDENT IN AN ORGANIZED HEALTH CARE EDUCATION/TRAINING PROGRAM

## 2025-07-25 PROCEDURE — 2500000004 HC RX 250 GENERAL PHARMACY W/ HCPCS (ALT 636 FOR OP/ED): Performed by: STUDENT IN AN ORGANIZED HEALTH CARE EDUCATION/TRAINING PROGRAM

## 2025-07-25 PROCEDURE — 71046 X-RAY EXAM CHEST 2 VIEWS: CPT | Mod: FOREIGN READ | Performed by: RADIOLOGY

## 2025-07-25 PROCEDURE — 96361 HYDRATE IV INFUSION ADD-ON: CPT

## 2025-07-25 RX ORDER — ACETAMINOPHEN 325 MG/1
975 TABLET ORAL ONCE
Status: COMPLETED | OUTPATIENT
Start: 2025-07-25 | End: 2025-07-25

## 2025-07-25 RX ORDER — METOCLOPRAMIDE HYDROCHLORIDE 5 MG/ML
10 INJECTION INTRAMUSCULAR; INTRAVENOUS ONCE
Status: COMPLETED | OUTPATIENT
Start: 2025-07-25 | End: 2025-07-25

## 2025-07-25 RX ADMIN — ACETAMINOPHEN 975 MG: 325 TABLET ORAL at 21:31

## 2025-07-25 RX ADMIN — SODIUM CHLORIDE 1000 ML: 0.9 INJECTION, SOLUTION INTRAVENOUS at 21:29

## 2025-07-25 RX ADMIN — METOCLOPRAMIDE 10 MG: 5 INJECTION, SOLUTION INTRAMUSCULAR; INTRAVENOUS at 21:29

## 2025-07-25 ASSESSMENT — LIFESTYLE VARIABLES
EVER HAD A DRINK FIRST THING IN THE MORNING TO STEADY YOUR NERVES TO GET RID OF A HANGOVER: NO
EVER FELT BAD OR GUILTY ABOUT YOUR DRINKING: NO
TOTAL SCORE: 0
HAVE PEOPLE ANNOYED YOU BY CRITICIZING YOUR DRINKING: NO
HAVE YOU EVER FELT YOU SHOULD CUT DOWN ON YOUR DRINKING: NO

## 2025-07-25 ASSESSMENT — PAIN SCALES - GENERAL: PAINLEVEL_OUTOF10: 3

## 2025-07-25 ASSESSMENT — PAIN - FUNCTIONAL ASSESSMENT: PAIN_FUNCTIONAL_ASSESSMENT: 0-10

## 2025-07-25 ASSESSMENT — PAIN DESCRIPTION - LOCATION: LOCATION: EAR

## 2025-07-26 NOTE — ED PROVIDER NOTES
HPI   Chief Complaint   Patient presents with    Drug Overdose     Pt has not taken meds in a while, toke all of his meds today, due to headache, does not fell right, bp stable.       HPI: 24-year-old female nonbinary patient who identifies as a male, he has a past medical history of hypertension, hypothyroidism, IgA deficiency, depression and anxiety, GERD, and seasonal allergies, who is relatively noncompliant with all medications, who is presenting to the emergency department for concern for overdosing on his medications.  Patient reports that he had a little bit of a headache yesterday and noted his blood pressure was elevated.  He took his losartan yesterday, and then he woke up this morning and took all of his medications, of which he has taken none of them in the last 2 months.  After taking all of his medications he began to notice that he was feeling lightheaded, nauseous, still had a headache, felt very hot and flushed, and overall not well with an episode of chest pain this morning.  Mom brought him in for further evaluation.  Other than feeling fatigued and a little lightheaded he is currently otherwise asymptomatic, intermittent nausea noted.      ROS: Complete 12 point review of systems performed, otherwise negative except as noted in the history of present illness    PMH: Reviewed, documented below in note. Pertinents in HPI  PSH: Reviewed and documented below in note. Pertinents in HPI  SH: No illicits, not homeless  Fam: Reviewed, noncontributory to patients current complaint  MEDS: Reviewed and documented below in note. Pertinents in HPI  ALLERGIES: Reviewed and documented below in note.        History provided by:  Patient   used: No                          [place] Coma Scale Score: 15                  Patient History   Medical History[1]  Surgical History[2]  Family History[3]  Social History[4]    Physical Exam   Visit Vitals  /81   Pulse 73   Temp 36.6 °C (97.9  "°F)   Resp 18   Ht 1.753 m (5' 9\")   Wt (!) 168 kg (370 lb)   SpO2 96%   BMI 54.64 kg/m²   Smoking Status Every Day   BSA 2.86 m²      Physical Exam  Vitals and nursing note reviewed.   Constitutional:       General: He is not in acute distress.     Appearance: Normal appearance. He is obese.   HENT:      Head: Normocephalic and atraumatic.   Neck:      Vascular: No carotid bruit.     Cardiovascular:      Rate and Rhythm: Regular rhythm. Tachycardia present.      Pulses: Normal pulses.      Heart sounds: Normal heart sounds.   Pulmonary:      Effort: Pulmonary effort is normal.      Breath sounds: Normal breath sounds.   Abdominal:      General: There is no distension.      Palpations: Abdomen is soft.      Tenderness: There is no abdominal tenderness. There is no guarding or rebound.     Musculoskeletal:         General: No tenderness, deformity or signs of injury.      Cervical back: Normal range of motion. No rigidity.     Skin:     General: Skin is warm and dry.      Capillary Refill: Capillary refill takes less than 2 seconds.     Neurological:      General: No focal deficit present.      Mental Status: He is alert and oriented to person, place, and time.      Sensory: No sensory deficit.      Motor: No weakness.     Psychiatric:         Mood and Affect: Mood normal.         Behavior: Behavior normal.         XR chest 2 views   Final Result   Normal PA and lateral chest.   Signed by Jaron Damico MD          Labs Reviewed   CBC WITH AUTO DIFFERENTIAL - Abnormal       Result Value    WBC 12.4 (*)     nRBC 0.0      RBC 5.06      Hemoglobin 13.7      Hematocrit 41.3      MCV 82      MCH 27.1      MCHC 33.2      RDW 13.8      Platelets 368      Neutrophils % 91.3      Immature Granulocytes %, Automated 0.3      Lymphocytes % 4.2      Monocytes % 3.4      Eosinophils % 0.5      Basophils % 0.3      Neutrophils Absolute 11.34 (*)     Immature Granulocytes Absolute, Automated 0.04      Lymphocytes Absolute 0.52 (*)  "    Monocytes Absolute 0.42      Eosinophils Absolute 0.06      Basophils Absolute 0.04     COMPREHENSIVE METABOLIC PANEL - Abnormal    Glucose 112 (*)     Sodium 136      Potassium 3.8      Chloride 107      Bicarbonate 22      Anion Gap 11      Urea Nitrogen 12      Creatinine 0.75      eGFR >90      Calcium 9.0      Albumin 4.2      Alkaline Phosphatase 56      Total Protein 7.0      AST 12      Bilirubin, Total 0.5      ALT 14     BLOOD GAS VENOUS FULL PANEL - Abnormal    POCT pH, Venous 7.40      POCT pCO2, Venous 40 (*)     POCT pO2, Venous 65 (*)     POCT SO2, Venous 93 (*)     POCT Oxy Hemoglobin, Venous 88.3 (*)     POCT Hematocrit Calculated, Venous 43.0      POCT Sodium, Venous 137      POCT Potassium, Venous 4.1      POCT Chloride, Venous 105      POCT Ionized Calicum, Venous 1.22      POCT Glucose, Venous 118 (*)     POCT Lactate, Venous 0.9      POCT Base Excess, Venous 0.0      POCT HCO3 Calculated, Venous 24.8      POCT Hemoglobin, Venous 14.2      POCT Anion Gap, Venous 11.0      Patient Temperature 37.0      FiO2 21     URINALYSIS WITH REFLEX CULTURE AND MICROSCOPIC - Abnormal    Color, Urine Light-Yellow      Appearance, Urine Turbid (*)     Specific Gravity, Urine 1.015      pH, Urine 5.5      Protein, Urine NEGATIVE      Glucose, Urine Normal      Blood, Urine 0.1 (1+) (*)     Ketones, Urine NEGATIVE      Bilirubin, Urine NEGATIVE      Urobilinogen, Urine Normal      Nitrite, Urine NEGATIVE      Leukocyte Esterase, Urine 250 Sally/uL (*)    MICROSCOPIC ONLY, URINE - Abnormal    WBC, Urine 21-50 (*)     RBC, Urine 3-5      Squamous Epithelial Cells, Urine 26-50 (1+)      Bacteria, Urine 1+ (*)     Mucus, Urine 4+      Amorphous Crystals, Urine 3+ (*)    TROPONIN I, HIGH SENSITIVITY - Normal    Troponin I, High Sensitivity 4      Narrative:     Less than 99th percentile of normal range cutoff-  Female and children under 18 years old <14 ng/L; Male <21 ng/L: Negative  Repeat testing should be  performed if clinically indicated.     Female and children under 18 years old 14-50 ng/L; Male 21-50 ng/L:  Consistent with possible cardiac damage and possible increased clinical   risk. Serial measurements may help to assess extent of myocardial damage.     >50 ng/L: Consistent with cardiac damage, increased clinical risk and  myocardial infarction. Serial measurements may help assess extent of   myocardial damage.      NOTE: Children less than 1 year old may have higher baseline troponin   levels and results should be interpreted in conjunction with the overall   clinical context.     NOTE: Troponin I testing is performed using a different   testing methodology at [facility] than at other   [facility]. Direct result comparisons should only   be made within the same method.   MAGNESIUM - Normal    Magnesium 1.96     TSH WITH REFLEX TO FREE T4 IF ABNORMAL - Normal    Thyroid Stimulating Hormone 1.75      Narrative:     TSH testing is performed using different testing methodology at [facility] than at [facility]. Direct result comparisons should only be made within the same method.     URINE CULTURE   URINALYSIS WITH REFLEX CULTURE AND MICROSCOPIC    Narrative:     The following orders were created for panel order Urinalysis with Reflex Culture and Microscopic.  Procedure                               Abnormality         Status                     ---------                               -----------         ------                     Urinalysis with Reflex C...[[id number]]  Abnormal            Final result               Extra Urine Gray Tube[[id number]]                            In process                   Please view results for these tests on the individual orders.   EXTRA URINE GRAY TUBE         ED Course & MDM   Diagnoses as of 07/25/25 2321   Accidental drug overdose, initial encounter           Medical Decision Making  All mentioned lab results, ECGs, and imaging  were independently reviewed by myself  - Patient evaluated. Patient is presenting to the emergency department for concern for overdosing on his medications.  While he took all of his medications as prescribed, he had not taken any medications for the past several months, and when he took all of them at once, he seemed to suffer the symptoms that he is experiencing currently, which could be a resultant side effect of the medications.  We do want to rule out dehydration, electrolyte derangements, ACS event, potential thyroid imbalance, or infection as a potential cause, so labs were obtained on the patient.  No evidence of meningitis on examination with the patient's headache, I suspect likely side effect of the medications, versus dehydration, versus tension headache or migraine.  IV was established, he was given fluids as well as oral Tylenol and Reglan.  Patient's labs demonstrate a normal lactate, normal kidney and liver function, normal cardiac troponin, normal TSH, electrolytes all within normal limits.  Patient has a mild leukocytosis, however no source of infection.  Urine shows leukocyte esterase and some white blood cells, but the patient has as many squamous epithelial cells as white cells, lowering suspicion that this is an acute infection, we will send it for culture at this time.  Patient's tachycardia defervesced after fluids, while sepsis was considered my suspicion for this is low.  On repeat evaluation after receiving treatment, the patient's vital signs are stable and he reports improved symptoms.  I feel he is stable for discharge with outpatient follow-up to his primary care physician.  I discussed the importance of medication compliance with him, but noted that he will likely need to be ease back into some of his medications given he has not been on them for several months.  This can be done as an outpatient with his primary care physician.  All questions were answered, he was discharged  otherwise stable condition with strict return precautions.  - Monitored for any changes in stability or symptomatology. Patient remained stable.   - Counseled regarding labs, imaging, diagnosis, and plan. Patient was agreeable. All questions were answered. The patient was receptive and agreeable to the plan of care.   -The patient was instructed to return to the emergency department if any symptoms recurred, worsened, or if there were any additional concerns.    *Disclaimer: This note was dictated by speech recognition. Minor errors in transcription may be present. Please call with questions.    Yury Weaver MD             Your medication list        ASK your doctor about these medications        Instructions Last Dose Given Next Dose Due   cetirizine 10 mg tablet  Commonly known as: ZyrTEC      Take 1 tablet (10 mg) by mouth once daily.       DULoxetine 60 mg DR capsule  Commonly known as: Cymbalta      Take 2 capsules (120 mg) by mouth once daily at bedtime.       hydrOXYzine pamoate 25 mg capsule  Commonly known as: Vistaril      Take 1 capsule (25 mg) by mouth 4 times a day as needed for anxiety.       hyoscyamine 0.125 mg disintegrating tablet  Commonly known as: Anaspaz      Dissolve 1 tablet (0.125 mg) in the mouth 4 times a day as needed (diarrhea).       levothyroxine 100 mcg tablet  Commonly known as: Synthroid, Levoxyl      Take 1 tablet (100 mcg) by mouth once daily.       losartan 25 mg tablet  Commonly known as: Cozaar      Take 1 tablet (25 mg) by mouth once daily.       metoclopramide 5 mg tablet  Commonly known as: Reglan      Take 1 tablet (5 mg) by mouth 4 times a day.       omeprazole 20 mg DR capsule  Commonly known as: PriLOSEC      Take 1 capsule (20 mg) by mouth once daily in the morning. Take before meals.       ondansetron ODT 4 mg disintegrating tablet  Commonly known as: Zofran-ODT      Take 1 tablet (4 mg) by mouth every 8 hours if needed for nausea or vomiting.                 Procedure  Procedures     *This report was transcribed using voice recognition software.  Every effort was made to ensure accuracy; however, inadvertent computerized transcription errors may be present.*  Davy Weaver MD  07/25/25           [1]   Past Medical History:  Diagnosis Date    Displaced fracture of fifth metatarsal bone, left foot, subsequent encounter for fracture with malunion 06/25/2021    Closed displaced fracture of fifth metatarsal bone of left foot with malunion, subsequent encounter    HTN (hypertension)     Hypothyroidism     IgA deficiency (Multi)     Other symptoms and signs involving the musculoskeletal system 07/02/2020    Ankle weakness    Personal history of other diseases of the digestive system 08/11/2021    History of rectal bleeding    Suicide attempt, initial encounter (Multi)     Suicidal behavior with attempted self-injury   [2]   Past Surgical History:  Procedure Laterality Date    OTHER SURGICAL HISTORY  06/17/2021    Tonsillectomy with adenoidectomy    WISDOM TOOTH EXTRACTION     [3]   Family History  Problem Relation Name Age of Onset    Hypertension Mother      Diabetes Mother          type II    Hyperlipidemia Mother      Mental illness Mother      Other (premature ventricular) Mother      Coronary artery disease Mother      COPD Father      Alcohol abuse Father      Sleep apnea Father      Atrial fibrillation Father      Cancer Other Grandparent     Diabetes Other Grandparent    [4]   Social History  Tobacco Use    Smoking status: Every Day     Current packs/day: 1.00     Average packs/day: 1 pack/day for 8.6 years (8.6 ttl pk-yrs)     Types: Cigarettes     Start date: 2017    Smokeless tobacco: Never   Vaping Use    Vaping status: Never Used   Substance Use Topics    Alcohol use: Not Currently    Drug use: Yes     Types: Marijuana        Davy Weaver MD  07/25/25 [id number]

## 2025-07-27 ENCOUNTER — APPOINTMENT (OUTPATIENT)
Dept: CARDIOLOGY | Facility: HOSPITAL | Age: 25
End: 2025-07-27
Payer: MEDICAID

## 2025-07-27 ENCOUNTER — HOSPITAL ENCOUNTER (EMERGENCY)
Facility: HOSPITAL | Age: 25
Discharge: HOME | End: 2025-07-27
Attending: EMERGENCY MEDICINE
Payer: MEDICAID

## 2025-07-27 ENCOUNTER — APPOINTMENT (OUTPATIENT)
Dept: RADIOLOGY | Facility: HOSPITAL | Age: 25
End: 2025-07-27
Payer: MEDICAID

## 2025-07-27 VITALS
RESPIRATION RATE: 18 BRPM | WEIGHT: 293 LBS | BODY MASS INDEX: 44.41 KG/M2 | SYSTOLIC BLOOD PRESSURE: 147 MMHG | TEMPERATURE: 98.8 F | OXYGEN SATURATION: 97 % | DIASTOLIC BLOOD PRESSURE: 83 MMHG | HEIGHT: 68 IN | HEART RATE: 83 BPM

## 2025-07-27 DIAGNOSIS — R07.9 CHEST PAIN, UNSPECIFIED TYPE: Primary | ICD-10-CM

## 2025-07-27 LAB
ALBUMIN SERPL BCP-MCNC: 4.3 G/DL (ref 3.4–5)
ALP SERPL-CCNC: 53 U/L (ref 33–120)
ALT SERPL W P-5'-P-CCNC: 15 U/L (ref 7–52)
ANION GAP SERPL CALC-SCNC: 14 MMOL/L (ref 10–20)
AST SERPL W P-5'-P-CCNC: 14 U/L (ref 9–39)
BACTERIA UR CULT: NORMAL
BASOPHILS # BLD AUTO: 0.04 X10*3/UL (ref 0–0.1)
BASOPHILS NFR BLD AUTO: 0.4 %
BILIRUB SERPL-MCNC: 0.8 MG/DL (ref 0–1.2)
BNP SERPL-MCNC: 22 PG/ML (ref 0–99)
BUN SERPL-MCNC: 10 MG/DL (ref 6–23)
CALCIUM SERPL-MCNC: 9.2 MG/DL (ref 8.6–10.3)
CARDIAC TROPONIN I PNL SERPL HS: 3 NG/L (ref 0–20)
CHLORIDE SERPL-SCNC: 105 MMOL/L (ref 98–107)
CO2 SERPL-SCNC: 21 MMOL/L (ref 21–32)
CREAT SERPL-MCNC: 0.64 MG/DL (ref 0.5–1.3)
EGFRCR SERPLBLD CKD-EPI 2021: >90 ML/MIN/1.73M*2
EOSINOPHIL # BLD AUTO: 0.04 X10*3/UL (ref 0–0.7)
EOSINOPHIL NFR BLD AUTO: 0.4 %
ERYTHROCYTE [DISTWIDTH] IN BLOOD BY AUTOMATED COUNT: 14 % (ref 11.5–14.5)
GLUCOSE SERPL-MCNC: 87 MG/DL (ref 74–99)
HCT VFR BLD AUTO: 41.4 % (ref 36–52)
HGB BLD-MCNC: 13.6 G/DL (ref 12–17.5)
IMM GRANULOCYTES # BLD AUTO: 0.03 X10*3/UL (ref 0–0.7)
IMM GRANULOCYTES NFR BLD AUTO: 0.3 % (ref 0–0.9)
LYMPHOCYTES # BLD AUTO: 0.76 X10*3/UL (ref 1.2–4.8)
LYMPHOCYTES NFR BLD AUTO: 7.2 %
MAGNESIUM SERPL-MCNC: 2.06 MG/DL (ref 1.6–2.4)
MCH RBC QN AUTO: 27 PG (ref 26–34)
MCHC RBC AUTO-ENTMCNC: 32.9 G/DL (ref 32–36)
MCV RBC AUTO: 82 FL (ref 80–100)
MONOCYTES # BLD AUTO: 0.54 X10*3/UL (ref 0.1–1)
MONOCYTES NFR BLD AUTO: 5.1 %
NEUTROPHILS # BLD AUTO: 9.14 X10*3/UL (ref 1.2–7.7)
NEUTROPHILS NFR BLD AUTO: 86.6 %
NRBC BLD-RTO: 0 /100 WBCS (ref 0–0)
PLATELET # BLD AUTO: 349 X10*3/UL (ref 150–450)
POTASSIUM SERPL-SCNC: 3.8 MMOL/L (ref 3.5–5.3)
PROT SERPL-MCNC: 7.1 G/DL (ref 6.4–8.2)
RBC # BLD AUTO: 5.04 X10*6/UL (ref 4–5.9)
SODIUM SERPL-SCNC: 136 MMOL/L (ref 136–145)
WBC # BLD AUTO: 10.6 X10*3/UL (ref 4.4–11.3)

## 2025-07-27 PROCEDURE — 71046 X-RAY EXAM CHEST 2 VIEWS: CPT

## 2025-07-27 PROCEDURE — 84075 ASSAY ALKALINE PHOSPHATASE: CPT | Performed by: NURSE PRACTITIONER

## 2025-07-27 PROCEDURE — 84484 ASSAY OF TROPONIN QUANT: CPT | Performed by: NURSE PRACTITIONER

## 2025-07-27 PROCEDURE — 83880 ASSAY OF NATRIURETIC PEPTIDE: CPT | Performed by: NURSE PRACTITIONER

## 2025-07-27 PROCEDURE — 83735 ASSAY OF MAGNESIUM: CPT | Performed by: NURSE PRACTITIONER

## 2025-07-27 PROCEDURE — 93005 ELECTROCARDIOGRAM TRACING: CPT

## 2025-07-27 PROCEDURE — 71046 X-RAY EXAM CHEST 2 VIEWS: CPT | Performed by: RADIOLOGY

## 2025-07-27 PROCEDURE — 36415 COLL VENOUS BLD VENIPUNCTURE: CPT | Performed by: NURSE PRACTITIONER

## 2025-07-27 PROCEDURE — 99285 EMERGENCY DEPT VISIT HI MDM: CPT | Mod: 25 | Performed by: EMERGENCY MEDICINE

## 2025-07-27 PROCEDURE — 85025 COMPLETE CBC W/AUTO DIFF WBC: CPT | Performed by: NURSE PRACTITIONER

## 2025-07-27 ASSESSMENT — PAIN SCALES - GENERAL: PAINLEVEL_OUTOF10: 5 - MODERATE PAIN

## 2025-07-27 ASSESSMENT — HEART SCORE
RISK FACTORS: 1-2 RISK FACTORS
HEART SCORE: 1
ECG: NORMAL
TROPONIN: LESS THAN OR EQUAL TO NORMAL LIMIT
HISTORY: SLIGHTLY SUSPICIOUS
AGE: <45

## 2025-07-27 ASSESSMENT — LIFESTYLE VARIABLES
TOTAL SCORE: 0
EVER HAD A DRINK FIRST THING IN THE MORNING TO STEADY YOUR NERVES TO GET RID OF A HANGOVER: NO
HAVE YOU EVER FELT YOU SHOULD CUT DOWN ON YOUR DRINKING: NO
EVER FELT BAD OR GUILTY ABOUT YOUR DRINKING: NO
HAVE PEOPLE ANNOYED YOU BY CRITICIZING YOUR DRINKING: NO

## 2025-07-27 ASSESSMENT — PAIN DESCRIPTION - PAIN TYPE: TYPE: ACUTE PAIN

## 2025-07-27 ASSESSMENT — PAIN - FUNCTIONAL ASSESSMENT: PAIN_FUNCTIONAL_ASSESSMENT: 0-10

## 2025-07-27 ASSESSMENT — PAIN DESCRIPTION - ORIENTATION: ORIENTATION: LEFT

## 2025-07-27 ASSESSMENT — PAIN DESCRIPTION - LOCATION: LOCATION: ARM

## 2025-07-27 NOTE — ED PROVIDER NOTES
HPI   Chief Complaint   Patient presents with    Arm Pain    feels like heart is cold       24-year-old female nonbinary patient who identifies as male with a past history of hypertension, hypothyroidism, IgA deficiency, depression, anxiety, GERD presents to the emergency department today for chest pain.  Patient states that he recently was noncompliant with medications and then resumed them last week.  Ultimately was seen in the emergency department 2 days ago for concern of accidental overdose.  Have lab work completed at that time and was ultimately discharged home.  Since that time he has not taken his medications other than the losartan which seems to be helping with his symptoms.  He has continued to have chest discomfort and became concerned.  No associated shortness of breath.  Does have nausea but no vomiting.  + diaphoresis.  Patient denies any dizziness headache or syncope.  No lower extremity swelling.  No history of clotting disorders.  States that he has been having increased amount of anxiety recently.                          [place] Coma Scale Score: 15                  Patient History   Medical History[1]  Surgical History[2]  Family History[3]  Social History[4]    Physical Exam   ED Triage Vitals [07/27/25 1419]   Temperature Heart Rate Respirations BP   37.1 °C (98.8 °F) 83 18 147/83      Pulse Ox Temp Source Heart Rate Source Patient Position   97 % Tympanic Monitor --      BP Location FiO2 (%)     -- --       Physical Exam  Vitals and nursing note reviewed.   Constitutional:       General: He is not in acute distress.     Appearance: Normal appearance. He is obese. He is not toxic-appearing.   HENT:      Right Ear: Tympanic membrane normal.      Left Ear: Tympanic membrane normal.      Mouth/Throat:      Mouth: Mucous membranes are moist.      Pharynx: Oropharynx is clear.     Eyes:      Extraocular Movements: Extraocular movements intact.      Pupils: Pupils are equal, round, and reactive to  light.       Cardiovascular:      Rate and Rhythm: Normal rate and regular rhythm.      Pulses: Normal pulses.      Heart sounds: Normal heart sounds.   Pulmonary:      Effort: Pulmonary effort is normal.      Breath sounds: Normal breath sounds.   Abdominal:      General: Abdomen is flat. Bowel sounds are normal.      Palpations: Abdomen is soft.      Tenderness: There is no abdominal tenderness.     Musculoskeletal:         General: Normal range of motion.      Cervical back: Normal range of motion and neck supple.      Right lower leg: No edema.      Left lower leg: No edema.     Skin:     General: Skin is warm and dry.      Capillary Refill: Capillary refill takes less than 2 seconds.     Neurological:      General: No focal deficit present.      Mental Status: He is alert and oriented to person, place, and time.     Psychiatric:         Mood and Affect: Mood normal.         Behavior: Behavior normal.         Judgment: Judgment normal.         Labs Reviewed   CBC WITH AUTO DIFFERENTIAL   COMPREHENSIVE METABOLIC PANEL   MAGNESIUM   TROPONIN SERIES- (INITIAL, 1 HR)    Narrative:     The following orders were created for panel order Troponin I Series, High Sensitivity (0, 1 HR).  Procedure                               Abnormality         Status                     ---------                               -----------         ------                     Troponin I, High Sensiti...[[id number]]                                                   Please view results for these tests on the individual orders.   B-TYPE NATRIURETIC PEPTIDE   SERIAL TROPONIN-INITIAL     Pain Management Panel           No data to display              XR chest 2 views    (Results Pending)       ED Course & MDM   Diagnoses as of 07/27/25 1823   Chest pain, unspecified type       Medical Decision Making  Initial evaluation patient is well-appearing the emergency department at this time.  I did review his most recent emergency department visit.   Patient had a TSH performed at that time therefore I did not go forward with TSH today.  Labs otherwise obtained troponin of 3 BNP mag CMP and CBC are all within normal limits chest x-ray shows no acute cardiopulmonary process.  Vital signs are stable.  Heart score of 1.  We educated patient and mother on results as well as outpatient follow-up return precautions they verbalized understanding of the plan of no further questions or concerns will be discharged in stable condition.        Procedure  Procedures      Differential Diagnoses include acs, electrolyte derangement, PE, CHF, anemia  This is not an exhaustive list of all the diagnosis and therapeutics are considered during the patient's evaluation for an emergency medical condition.    I discussed the differential, results and discharge plan with the patient and/or family/friend/caregiver if present.  I emphasized the importance of follow-up with the physician I referred them to in the timeframe recommended.  I explained reasons for the patient to return to the Emergency Department. Additional verbal discharge instructions were also given and discussed with the patient to supplement those generated by the EMR. We also discussed medications that were prescribed (if any) including common side effects and interactions. The patient was advised to abstain from driving, operating heavy machinery or making significant decisions while taking medications such as opiates and muscle relaxers that may impair this. All questions were addressed.  They understand return precautions and discharge instructions. The patient and/or family/friend/caregiver expressed understanding.             [1]   Past Medical History:  Diagnosis Date    Displaced fracture of fifth metatarsal bone, left foot, subsequent encounter for fracture with malunion 06/25/2021    Closed displaced fracture of fifth metatarsal bone of left foot with malunion, subsequent encounter    HTN (hypertension)      Hypothyroidism     IgA deficiency (Multi)     Other symptoms and signs involving the musculoskeletal system 07/02/2020    Ankle weakness    Personal history of other diseases of the digestive system 08/11/2021    History of rectal bleeding    Suicide attempt, initial encounter (Multi)     Suicidal behavior with attempted self-injury   [2]   Past Surgical History:  Procedure Laterality Date    OTHER SURGICAL HISTORY  06/17/2021    Tonsillectomy with adenoidectomy    WISDOM TOOTH EXTRACTION     [3]   Family History  Problem Relation Name Age of Onset    Hypertension Mother      Diabetes Mother          type II    Hyperlipidemia Mother      Mental illness Mother      Other (premature ventricular) Mother      Coronary artery disease Mother      COPD Father      Alcohol abuse Father      Sleep apnea Father      Atrial fibrillation Father      Cancer Other Grandparent     Diabetes Other Grandparent    [4]   Social History  Tobacco Use    Smoking status: Every Day     Current packs/day: 1.00     Average packs/day: 1 pack/day for 8.6 years (8.6 ttl pk-yrs)     Types: Cigarettes     Start date: 2017    Smokeless tobacco: Never   Vaping Use    Vaping status: Never Used   Substance Use Topics    Alcohol use: Not Currently    Drug use: Yes     Types: Marijuana        YANA Maynard-SAMANTHA  07/27/25 1828

## 2025-07-27 NOTE — ED TRIAGE NOTES
"C/o multiple complaints. Pt was seen for overdose a few days ago and is now complaining of cold feeling in chest, arm pain and neck pain. Pt denies N/V/D/SOB. Pt denies chest pain states \"heart feels cold\"   "

## 2025-07-28 LAB
ATRIAL RATE: 75 BPM
HOLD SPECIMEN: NORMAL
P AXIS: 18 DEGREES
PR INTERVAL: 159 MS
Q ONSET: 253 MS
QRS COUNT: 13 BEATS
QRS DURATION: 107 MS
QT INTERVAL: 395 MS
QTC CALCULATION(BAZETT): 442 MS
QTC FREDERICIA: 425 MS
R AXIS: 37 DEGREES
T AXIS: 20 DEGREES
T OFFSET: 451 MS
VENTRICULAR RATE: 75 BPM

## 2025-07-30 ENCOUNTER — APPOINTMENT (OUTPATIENT)
Dept: PRIMARY CARE | Facility: CLINIC | Age: 25
End: 2025-07-30
Payer: MEDICAID

## 2025-07-31 ENCOUNTER — OFFICE VISIT (OUTPATIENT)
Dept: PRIMARY CARE | Facility: CLINIC | Age: 25
End: 2025-07-31
Payer: MEDICAID

## 2025-07-31 VITALS
RESPIRATION RATE: 16 BRPM | HEART RATE: 100 BPM | OXYGEN SATURATION: 96 % | WEIGHT: 293 LBS | BODY MASS INDEX: 44.41 KG/M2 | HEIGHT: 68 IN | DIASTOLIC BLOOD PRESSURE: 85 MMHG | SYSTOLIC BLOOD PRESSURE: 120 MMHG

## 2025-07-31 DIAGNOSIS — E55.9 VITAMIN D DEFICIENCY: ICD-10-CM

## 2025-07-31 DIAGNOSIS — Z13.220 LIPID SCREENING: ICD-10-CM

## 2025-07-31 DIAGNOSIS — K31.84 GASTROPARESIS: ICD-10-CM

## 2025-07-31 DIAGNOSIS — R73.9 HYPERGLYCEMIA: ICD-10-CM

## 2025-07-31 DIAGNOSIS — I10 ESSENTIAL HYPERTENSION: Primary | ICD-10-CM

## 2025-07-31 DIAGNOSIS — K58.0 IRRITABLE BOWEL SYNDROME WITH DIARRHEA: ICD-10-CM

## 2025-07-31 DIAGNOSIS — J30.2 SEASONAL ALLERGIES: ICD-10-CM

## 2025-07-31 DIAGNOSIS — F41.9 SEVERE ANXIETY: ICD-10-CM

## 2025-07-31 DIAGNOSIS — K21.9 GASTROESOPHAGEAL REFLUX DISEASE WITHOUT ESOPHAGITIS: ICD-10-CM

## 2025-07-31 DIAGNOSIS — E03.9 ADULT HYPOTHYROIDISM: ICD-10-CM

## 2025-07-31 DIAGNOSIS — F32.2 SEVERE MAJOR DEPRESSION (MULTI): ICD-10-CM

## 2025-07-31 DIAGNOSIS — F43.10 PTSD (POST-TRAUMATIC STRESS DISORDER): ICD-10-CM

## 2025-07-31 PROCEDURE — 3074F SYST BP LT 130 MM HG: CPT | Performed by: FAMILY MEDICINE

## 2025-07-31 PROCEDURE — 3008F BODY MASS INDEX DOCD: CPT | Performed by: FAMILY MEDICINE

## 2025-07-31 PROCEDURE — 99214 OFFICE O/P EST MOD 30 MIN: CPT | Performed by: FAMILY MEDICINE

## 2025-07-31 PROCEDURE — 3079F DIAST BP 80-89 MM HG: CPT | Performed by: FAMILY MEDICINE

## 2025-07-31 RX ORDER — METOCLOPRAMIDE 5 MG/1
5 TABLET ORAL DAILY
Qty: 90 TABLET | Refills: 1 | Status: SHIPPED | OUTPATIENT
Start: 2025-07-31 | End: 2026-01-27

## 2025-07-31 RX ORDER — OMEPRAZOLE 20 MG/1
20 CAPSULE, DELAYED RELEASE ORAL
Qty: 90 CAPSULE | Refills: 1 | Status: SHIPPED | OUTPATIENT
Start: 2025-07-31

## 2025-07-31 ASSESSMENT — ANXIETY QUESTIONNAIRES
2. NOT BEING ABLE TO STOP OR CONTROL WORRYING: MORE THAN HALF THE DAYS
5. BEING SO RESTLESS THAT IT IS HARD TO SIT STILL: SEVERAL DAYS
4. TROUBLE RELAXING: SEVERAL DAYS
GAD7 TOTAL SCORE: 14
6. BECOMING EASILY ANNOYED OR IRRITABLE: MORE THAN HALF THE DAYS
1. FEELING NERVOUS, ANXIOUS, OR ON EDGE: NEARLY EVERY DAY
3. WORRYING TOO MUCH ABOUT DIFFERENT THINGS: MORE THAN HALF THE DAYS
IF YOU CHECKED OFF ANY PROBLEMS ON THIS QUESTIONNAIRE, HOW DIFFICULT HAVE THESE PROBLEMS MADE IT FOR YOU TO DO YOUR WORK, TAKE CARE OF THINGS AT HOME, OR GET ALONG WITH OTHER PEOPLE: VERY DIFFICULT
7. FEELING AFRAID AS IF SOMETHING AWFUL MIGHT HAPPEN: NEARLY EVERY DAY

## 2025-07-31 ASSESSMENT — ENCOUNTER SYMPTOMS
FREQUENCY: 0
NERVOUS/ANXIOUS: 0
SHORTNESS OF BREATH: 0
LIGHT-HEADEDNESS: 0
NAUSEA: 0
SORE THROAT: 0
FEVER: 0
DIAPHORESIS: 0
ABDOMINAL PAIN: 0
VOMITING: 0
NUMBNESS: 0
UNEXPECTED WEIGHT CHANGE: 0
DIZZINESS: 0
HEADACHES: 0
PALPITATIONS: 0
SINUS PAIN: 0
CHEST TIGHTNESS: 0
HEMATURIA: 0
CONFUSION: 0
WHEEZING: 0
POLYDIPSIA: 0
DIARRHEA: 0
DYSPHORIC MOOD: 0
CONSTIPATION: 0
COUGH: 0
POLYPHAGIA: 0
ADENOPATHY: 0
DYSURIA: 0
CHILLS: 0
SINUS PRESSURE: 0

## 2025-07-31 ASSESSMENT — PATIENT HEALTH QUESTIONNAIRE - PHQ9
2. FEELING DOWN, DEPRESSED OR HOPELESS: MORE THAN HALF THE DAYS
SUM OF ALL RESPONSES TO PHQ QUESTIONS 1-9: 18
8. MOVING OR SPEAKING SO SLOWLY THAT OTHER PEOPLE COULD HAVE NOTICED. OR THE OPPOSITE, BEING SO FIGETY OR RESTLESS THAT YOU HAVE BEEN MOVING AROUND A LOT MORE THAN USUAL: MORE THAN HALF THE DAYS
5. POOR APPETITE OR OVEREATING: NEARLY EVERY DAY
SUM OF ALL RESPONSES TO PHQ9 QUESTIONS 1 & 2: 4
7. TROUBLE CONCENTRATING ON THINGS, SUCH AS READING THE NEWSPAPER OR WATCHING TELEVISION: MORE THAN HALF THE DAYS
1. LITTLE INTEREST OR PLEASURE IN DOING THINGS: MORE THAN HALF THE DAYS
4. FEELING TIRED OR HAVING LITTLE ENERGY: MORE THAN HALF THE DAYS
9. THOUGHTS THAT YOU WOULD BE BETTER OFF DEAD, OR OF HURTING YOURSELF: SEVERAL DAYS
3. TROUBLE FALLING OR STAYING ASLEEP: MORE THAN HALF THE DAYS
6. FEELING BAD ABOUT YOURSELF - OR THAT YOU ARE A FAILURE OR HAVE LET YOURSELF OR YOUR FAMILY DOWN: MORE THAN HALF THE DAYS

## 2025-07-31 NOTE — ASSESSMENT & PLAN NOTE
- had gone about 3 months without levothyroxine and TSH done in ED was normal, so will continue OFF levothyroxine  - I think the symptoms he was having were primarily caused by the levothyroxine

## 2025-07-31 NOTE — ASSESSMENT & PLAN NOTE
- following with counselor  - he has not been on the duloxetine for about 3 months. Feels he is doing better off of it. Would like to stay off of depression/ anxiety medication for now and continue with just counseling

## 2025-07-31 NOTE — PATIENT INSTRUCTIONS
Isabella Tran ,    Thank you for coming in today. We at [facility] appreciate your trust in our care. If you have any questions or concerns about the care you received today, please do not hesitate to contact us at [phone].    The following instructions were discussed today:    - STOP levothyroxine   - STOP duloxetine   - STOP hydroxyzine   - STOP cetirizine   - CONTINUE losartan 25 mg daily   - CONTINUE zofran 4 mg three times daily as needed for nausea  - START hyoscyamine 0.125 mg four times daily as needed  - START omeprazole 20 mg daily   - START reglan 5 mg daily   - Follow up 8/20/2025 as scheduled.    - Please get blood work done 1-2 weeks prior to your next visit. For blood work: Nothing to eat or drink for at least 10 hours prior. Okay for water or black coffee.

## 2025-07-31 NOTE — ASSESSMENT & PLAN NOTE
- has been back on losartan for about a week  - blood pressure looks good today   - continue losartan 25 mg daily

## 2025-07-31 NOTE — PROGRESS NOTES
Subjective   Patient ID: Harris Tran is a 24 y.o. adult who presents for ER Follow-up (Bp issues and abdominal pain, nausea/Pt was told to stop all medication until visit with pcp.  Mom is making Harris take Losartan ).    HPI   Here today for ED follow up. Was seen 7/25/25 and 7/27/25. Both notes reviewed.     He was seen on 7/25/25 with concern of overdosing on his medications. Had not taken any of medications for about 3 months and then developed headache and not feeling well, so he took all of his medications. Felt dizzy, hot, and had an episode of chest pain after taking all of his medications. Blood pressure in ED was 147/81. He was given fluids, tylenol, reglan. Labs showed mild leukocytosis. Normal TSH He was discharged home with instructions to follow up with me to discuss slowly going back on medications.     Was seen again in the ED on 7/27/25. At this time, the only medication he was taking was losartan. Blood pressure at that time was 147/83. Most of his symptoms from prior visit had resolved, but continued with chest discomfort so returned to ED. Was also having diaphoresis and nausea. Normal troponin, normal BNP, normal magnesium. CBC normal with white blood cell count returned to normal range. Normal chest x-ray. EKG done in the hospital was normal.     Now he is feeling better overall but with some lingering symptoms. Still having some palpitations but those are improving.     Admission on 07/27/2025, Discharged on 07/27/2025   Component Date Value Ref Range Status    Ventricular Rate 07/27/2025 75  BPM Preliminary    Atrial Rate 07/27/2025 75  BPM Preliminary    MO Interval 07/27/2025 159  ms Preliminary    QRS Duration 07/27/2025 107  ms Preliminary    QT Interval 07/27/2025 395  ms Preliminary    QTC Calculation(Bazett) 07/27/2025 442  ms Preliminary    P Axis 07/27/2025 18  degrees Preliminary    R Axis 07/27/2025 37  degrees Preliminary    T Axis 07/27/2025 20  degrees Preliminary    QRS  Count 07/27/2025 13  beats Preliminary    Q Onset 07/27/2025 253  ms Preliminary    T Offset 07/27/2025 451  ms Preliminary    QTC Fredericia 07/27/2025 425  ms Preliminary    WBC 07/27/2025 10.6  4.4 - 11.3 x10*3/uL Final    nRBC 07/27/2025 0.0  0.0 - 0.0 /100 WBCs Final    RBC 07/27/2025 5.04  4.00 - 5.90 x10*6/uL Final    Hemoglobin 07/27/2025 13.6  12.0 - 17.5 g/dL Final    Hematocrit 07/27/2025 41.4  36.0 - 52.0 % Final    MCV 07/27/2025 82  80 - 100 fL Final    MCH 07/27/2025 27.0  26.0 - 34.0 pg Final    MCHC 07/27/2025 32.9  32.0 - 36.0 g/dL Final    RDW 07/27/2025 14.0  11.5 - 14.5 % Final    Platelets 07/27/2025 349  150 - 450 x10*3/uL Final    Neutrophils % 07/27/2025 86.6  40.0 - 80.0 % Final    Immature Granulocytes %, Automated 07/27/2025 0.3  0.0 - 0.9 % Final    Immature Granulocyte Count (IG) includes promyelocytes, myelocytes and metamyelocytes but does not include bands. Percent differential counts (%) should be interpreted in the context of the absolute cell counts (cells/UL).    Lymphocytes % 07/27/2025 7.2  13.0 - 44.0 % Final    Monocytes % 07/27/2025 5.1  2.0 - 10.0 % Final    Eosinophils % 07/27/2025 0.4  0.0 - 6.0 % Final    Basophils % 07/27/2025 0.4  0.0 - 2.0 % Final    Neutrophils Absolute 07/27/2025 9.14 (H)  1.20 - 7.70 x10*3/uL Final    Percent differential counts (%) should be interpreted in the context of the absolute cell counts (cells/uL).    Immature Granulocytes Absolute, Au* 07/27/2025 0.03  0.00 - 0.70 x10*3/uL Final    Lymphocytes Absolute 07/27/2025 0.76 (L)  1.20 - 4.80 x10*3/uL Final    Monocytes Absolute 07/27/2025 0.54  0.10 - 1.00 x10*3/uL Final    Eosinophils Absolute 07/27/2025 0.04  0.00 - 0.70 x10*3/uL Final    Basophils Absolute 07/27/2025 0.04  0.00 - 0.10 x10*3/uL Final    Glucose 07/27/2025 87  74 - 99 mg/dL Final    Sodium 07/27/2025 136  136 - 145 mmol/L Final    Potassium 07/27/2025 3.8  3.5 - 5.3 mmol/L Final    Chloride 07/27/2025 105  98 - 107 mmol/L  Final    Bicarbonate 07/27/2025 21  21 - 32 mmol/L Final    Anion Gap 07/27/2025 14  10 - 20 mmol/L Final    Urea Nitrogen 07/27/2025 10  6 - 23 mg/dL Final    Creatinine 07/27/2025 0.64  0.50 - 1.30 mg/dL Final    eGFR 07/27/2025 >90  >60 mL/min/1.73m*2 Final    Calculations of estimated GFR are performed using the 2021 CKD-EPI Study Refit equation without the race variable for the IDMS-Traceable creatinine methods.  https://jasn.asnjournals.org/content/early/2021/09/22/ASN.7272563517    Calcium 07/27/2025 9.2  8.6 - 10.3 mg/dL Final    Albumin 07/27/2025 4.3  3.4 - 5.0 g/dL Final    Alkaline Phosphatase 07/27/2025 53  33 - 120 U/L Final    Total Protein 07/27/2025 7.1  6.4 - 8.2 g/dL Final    AST 07/27/2025 14  9 - 39 U/L Final    Bilirubin, Total 07/27/2025 0.8  0.0 - 1.2 mg/dL Final    ALT 07/27/2025 15  7 - 52 U/L Final    Patients treated with Sulfasalazine may generate falsely decreased results for ALT.    Magnesium 07/27/2025 2.06  1.60 - 2.40 mg/dL Final    BNP 07/27/2025 22  0 - 99 pg/mL Final    Troponin I, High Sensitivity 07/27/2025 3  0 - 20 ng/L Final   Admission on 07/25/2025, Discharged on 07/25/2025   Component Date Value Ref Range Status    WBC 07/25/2025 12.4 (H)  4.4 - 11.3 x10*3/uL Final    nRBC 07/25/2025 0.0  0.0 - 0.0 /100 WBCs Final    RBC 07/25/2025 5.06  4.00 - 5.90 x10*6/uL Final    Hemoglobin 07/25/2025 13.7  12.0 - 17.5 g/dL Final    Hematocrit 07/25/2025 41.3  36.0 - 52.0 % Final    MCV 07/25/2025 82  80 - 100 fL Final    MCH 07/25/2025 27.1  26.0 - 34.0 pg Final    MCHC 07/25/2025 33.2  32.0 - 36.0 g/dL Final    RDW 07/25/2025 13.8  11.5 - 14.5 % Final    Platelets 07/25/2025 368  150 - 450 x10*3/uL Final    Neutrophils % 07/25/2025 91.3  40.0 - 80.0 % Final    Immature Granulocytes %, Automated 07/25/2025 0.3  0.0 - 0.9 % Final    Immature Granulocyte Count (IG) includes promyelocytes, myelocytes and metamyelocytes but does not include bands. Percent differential counts (%) should  be interpreted in the context of the absolute cell counts (cells/UL).    Lymphocytes % 07/25/2025 4.2  13.0 - 44.0 % Final    Monocytes % 07/25/2025 3.4  2.0 - 10.0 % Final    Eosinophils % 07/25/2025 0.5  0.0 - 6.0 % Final    Basophils % 07/25/2025 0.3  0.0 - 2.0 % Final    Neutrophils Absolute 07/25/2025 11.34 (H)  1.20 - 7.70 x10*3/uL Final    Percent differential counts (%) should be interpreted in the context of the absolute cell counts (cells/uL).    Immature Granulocytes Absolute, Au* 07/25/2025 0.04  0.00 - 0.70 x10*3/uL Final    Lymphocytes Absolute 07/25/2025 0.52 (L)  1.20 - 4.80 x10*3/uL Final    Monocytes Absolute 07/25/2025 0.42  0.10 - 1.00 x10*3/uL Final    Eosinophils Absolute 07/25/2025 0.06  0.00 - 0.70 x10*3/uL Final    Basophils Absolute 07/25/2025 0.04  0.00 - 0.10 x10*3/uL Final    Glucose 07/25/2025 112 (H)  74 - 99 mg/dL Final    Sodium 07/25/2025 136  136 - 145 mmol/L Final    Potassium 07/25/2025 3.8  3.5 - 5.3 mmol/L Final    Chloride 07/25/2025 107  98 - 107 mmol/L Final    Bicarbonate 07/25/2025 22  21 - 32 mmol/L Final    Anion Gap 07/25/2025 11  10 - 20 mmol/L Final    Urea Nitrogen 07/25/2025 12  6 - 23 mg/dL Final    Creatinine 07/25/2025 0.75  0.50 - 1.30 mg/dL Final    eGFR 07/25/2025 >90  >60 mL/min/1.73m*2 Final    Calculations of estimated GFR are performed using the 2021 CKD-EPI Study Refit equation without the race variable for the IDMS-Traceable creatinine methods.  https://jasn.asnjournals.org/content/early/2021/09/22/ASN.6679638886    Calcium 07/25/2025 9.0  8.6 - 10.3 mg/dL Final    Albumin 07/25/2025 4.2  3.4 - 5.0 g/dL Final    Alkaline Phosphatase 07/25/2025 56  33 - 120 U/L Final    Total Protein 07/25/2025 7.0  6.4 - 8.2 g/dL Final    AST 07/25/2025 12  9 - 39 U/L Final    Bilirubin, Total 07/25/2025 0.5  0.0 - 1.2 mg/dL Final    ALT 07/25/2025 14  7 - 52 U/L Final    Patients treated with Sulfasalazine may generate falsely decreased results for ALT.    Troponin I,  High Sensitivity 07/25/2025 4  0 - 20 ng/L Final    POCT pH, Venous 07/25/2025 7.40  7.33 - 7.43 pH Final    POCT pCO2, Venous 07/25/2025 40 (L)  41 - 51 mm Hg Final    POCT pO2, Venous 07/25/2025 65 (H)  35 - 45 mm Hg Final    POCT SO2, Venous 07/25/2025 93 (H)  45 - 75 % Final    POCT Oxy Hemoglobin, Venous 07/25/2025 88.3 (H)  45.0 - 75.0 % Final    POCT Hematocrit Calculated, Venous 07/25/2025 43.0  36.0 - 52.0 % Final    POCT Sodium, Venous 07/25/2025 137  136 - 145 mmol/L Final    POCT Potassium, Venous 07/25/2025 4.1  3.5 - 5.3 mmol/L Final    POCT Chloride, Venous 07/25/2025 105  98 - 107 mmol/L Final    POCT Ionized Calicum, Venous 07/25/2025 1.22  1.10 - 1.33 mmol/L Final    POCT Glucose, Venous 07/25/2025 118 (H)  74 - 99 mg/dL Final    POCT Lactate, Venous 07/25/2025 0.9  0.4 - 2.0 mmol/L Final    POCT Base Excess, Venous 07/25/2025 0.0  -2.0 - 3.0 mmol/L Final    POCT HCO3 Calculated, Venous 07/25/2025 24.8  22.0 - 26.0 mmol/L Final    POCT Hemoglobin, Venous 07/25/2025 14.2  12.0 - 17.5 g/dL Final    POCT Anion Gap, Venous 07/25/2025 11.0  10.0 - 25.0 mmol/L Final    Patient Temperature 07/25/2025 37.0  degrees Celsius Final    FiO2 07/25/2025 21  % Final    Magnesium 07/25/2025 1.96  1.60 - 2.40 mg/dL Final    Thyroid Stimulating Hormone 07/25/2025 1.75  0.44 - 3.98 mIU/L Final    Color, Urine 07/25/2025 Light-Yellow  Light-Yellow, Yellow, Dark-Yellow Final    Appearance, Urine 07/25/2025 Turbid (N)  Clear Final    Specific Gravity, Urine 07/25/2025 1.015  1.005 - 1.035 Final    pH, Urine 07/25/2025 5.5  5.0, 5.5, 6.0, 6.5, 7.0, 7.5, 8.0 Final    Protein, Urine 07/25/2025 NEGATIVE  NEGATIVE, 10 (TRACE), 20 (TRACE) mg/dL Final    Glucose, Urine 07/25/2025 Normal  Normal mg/dL Final    Blood, Urine 07/25/2025 0.1 (1+) (A)  NEGATIVE mg/dL Final    Ketones, Urine 07/25/2025 NEGATIVE  NEGATIVE mg/dL Final    Bilirubin, Urine 07/25/2025 NEGATIVE  NEGATIVE mg/dL Final    Urobilinogen, Urine 07/25/2025 Normal  " Normal mg/dL Final    Nitrite, Urine 07/25/2025 NEGATIVE  NEGATIVE Final    Leukocyte Esterase, Urine 07/25/2025 250 Sally/uL (A)  NEGATIVE Final    WBC, Urine 07/25/2025 21-50 (A)  1-5, NONE /HPF Final    RBC, Urine 07/25/2025 3-5  NONE, 1-2, 3-5 /HPF Final    Squamous Epithelial Cells, Urine 07/25/2025 26-50 (1+)  Reference range not established. /HPF Final    Bacteria, Urine 07/25/2025 1+ (A)  NONE SEEN /HPF Final    Mucus, Urine 07/25/2025 4+  Reference range not established. /LPF Final    Amorphous Crystals, Urine 07/25/2025 3+ (A)  NONE, 1+, 2+ /HPF Final    Urine Culture 07/25/2025 Growth indicates contamination with periurethral nicholas. Repeat culture if clinically indicated.   Final        Review of Systems   Constitutional:  Negative for chills, diaphoresis, fever and unexpected weight change.   HENT:  Negative for congestion, sinus pressure, sinus pain, sneezing and sore throat.    Respiratory:  Negative for cough, chest tightness, shortness of breath and wheezing.    Cardiovascular:  Negative for chest pain, palpitations and leg swelling.   Gastrointestinal:  Negative for abdominal pain, constipation, diarrhea, nausea and vomiting.   Endocrine: Negative for cold intolerance, heat intolerance, polydipsia, polyphagia and polyuria.   Genitourinary:  Negative for dysuria, frequency, hematuria and urgency.   Neurological:  Negative for dizziness, syncope, light-headedness, numbness and headaches.   Hematological:  Negative for adenopathy.   Psychiatric/Behavioral:  Negative for confusion and dysphoric mood. The patient is not nervous/anxious.        Objective   /85 (BP Location: Left arm, Patient Position: Sitting, BP Cuff Size: Large adult long)   Pulse 100   Resp 16   Ht 1.727 m (5' 8\")   Wt (!) 161 kg (355 lb)   LMP 07/20/2025   SpO2 96%   BMI 53.98 kg/m²     Physical Exam  Vitals and nursing note reviewed.   Constitutional:       General: He is not in acute distress.     Appearance: Normal " appearance.   HENT:      Head: Normocephalic and atraumatic.      Nose: Nose normal.     Eyes:      Extraocular Movements: Extraocular movements intact.      Conjunctiva/sclera: Conjunctivae normal.      Pupils: Pupils are equal, round, and reactive to light.       Cardiovascular:      Rate and Rhythm: Normal rate and regular rhythm.      Heart sounds: No murmur heard.     No friction rub. No gallop.   Pulmonary:      Effort: Pulmonary effort is normal.      Breath sounds: Normal breath sounds. No wheezing, rhonchi or rales.   Abdominal:      General: Bowel sounds are normal. There is no distension.      Palpations: Abdomen is soft.      Tenderness: There is no abdominal tenderness.     Musculoskeletal:         General: Normal range of motion.      Cervical back: Normal range of motion and neck supple.     Skin:     General: Skin is warm and dry.     Neurological:      General: No focal deficit present.      Mental Status: He is alert and oriented to person, place, and time.      Deep Tendon Reflexes: Reflexes normal.     Psychiatric:         Mood and Affect: Mood normal.         Behavior: Behavior normal.         Thought Content: Thought content normal.         Judgment: Judgment normal.         Assessment/Plan   Problem List Items Addressed This Visit           ICD-10-CM    Adult hypothyroidism E03.9    - had gone about 3 months without levothyroxine and TSH done in ED was normal, so will continue OFF levothyroxine  - I think the symptoms he was having were primarily caused by the levothyroxine          Relevant Orders    Vitamin B12    TSH with reflex to Free T4 if abnormal    Essential hypertension - Primary I10    - has been back on losartan for about a week  - blood pressure looks good today   - continue losartan 25 mg daily          Relevant Orders    Vitamin B12    Gastroesophageal reflux disease without esophagitis K21.9    - restart omeprazole          Relevant Medications    omeprazole (PriLOSEC) 20 mg   capsule    metoclopramide (Reglan) 5 mg tablet    Other Relevant Orders    Vitamin B12    Gastroparesis K31.84    - restart metoclopramide 5 mg daily          Relevant Medications    metoclopramide (Reglan) 5 mg tablet    Other Relevant Orders    Vitamin B12    Irritable bowel syndrome with diarrhea K58.0    - restart hyoscyamine as needed          Relevant Orders    Vitamin B12    PTSD (post-traumatic stress disorder) F43.10    - following with counselor  - he has not been on the duloxetine for about 3 months. Feels he is doing better off of it. Would like to stay off of depression/ anxiety medication for now and continue with just counseling          Relevant Orders    Vitamin B12    Seasonal allergies J30.2    - currently not having symptoms, so will stay off cetirizine. Can restart if needed          Relevant Orders    Vitamin B12    Severe anxiety F41.9    - following with counselor  - he has not been on the duloxetine for about 3 months. Feels he is doing better off of it. Would like to stay off of depression/ anxiety medication for now and continue with just counseling          Relevant Orders    Vitamin B12    Severe major depression (Multi) F32.2    - following with counselor  - he has not been on the duloxetine for about 3 months. Feels he is doing better off of it. Would like to stay off of depression/ anxiety medication for now and continue with just counseling          Relevant Orders    Vitamin B12    Vitamin D 25-Hydroxy,Total (for eval of Vitamin D levels)    Vitamin D deficiency E55.9    Relevant Orders    Vitamin B12     Other Visit Diagnoses         Codes      Hyperglycemia     R73.9    Relevant Orders    Hemoglobin A1C      Lipid screening     Z13.220    Relevant Orders    Lipid Panel

## 2025-08-07 LAB
ATRIAL RATE: 75 BPM
P AXIS: 18 DEGREES
PR INTERVAL: 159 MS
Q ONSET: 253 MS
QRS COUNT: 13 BEATS
QRS DURATION: 107 MS
QT INTERVAL: 395 MS
QTC CALCULATION(BAZETT): 442 MS
QTC FREDERICIA: 425 MS
R AXIS: 37 DEGREES
T AXIS: 20 DEGREES
T OFFSET: 451 MS
VENTRICULAR RATE: 75 BPM

## 2025-08-19 LAB
25(OH)D3+25(OH)D2 SERPL-MCNC: 18 NG/ML (ref 30–100)
CHOLEST SERPL-MCNC: 229 MG/DL
CHOLEST/HDLC SERPL: 5.7 (CALC)
EST. AVERAGE GLUCOSE BLD GHB EST-MCNC: 114 MG/DL
EST. AVERAGE GLUCOSE BLD GHB EST-SCNC: 6.3 MMOL/L
HBA1C MFR BLD: 5.6 %
HDLC SERPL-MCNC: 40 MG/DL
LDLC SERPL CALC-MCNC: 157 MG/DL (CALC)
NONHDLC SERPL-MCNC: 189 MG/DL (CALC)
TRIGL SERPL-MCNC: 188 MG/DL
TSH SERPL-ACNC: 2.34 MIU/L
VIT B12 SERPL-MCNC: 328 PG/ML (ref 200–1100)

## 2025-08-20 ENCOUNTER — APPOINTMENT (OUTPATIENT)
Dept: PRIMARY CARE | Facility: CLINIC | Age: 25
End: 2025-08-20
Payer: MEDICAID

## 2025-08-20 VITALS
WEIGHT: 293 LBS | HEIGHT: 68 IN | OXYGEN SATURATION: 96 % | HEART RATE: 81 BPM | BODY MASS INDEX: 44.41 KG/M2 | RESPIRATION RATE: 16 BRPM | SYSTOLIC BLOOD PRESSURE: 130 MMHG | DIASTOLIC BLOOD PRESSURE: 82 MMHG

## 2025-08-20 DIAGNOSIS — E66.813 CLASS 3 SEVERE OBESITY DUE TO EXCESS CALORIES WITHOUT SERIOUS COMORBIDITY WITH BODY MASS INDEX (BMI) OF 50.0 TO 59.9 IN ADULT: ICD-10-CM

## 2025-08-20 DIAGNOSIS — F17.210 CIGARETTE NICOTINE DEPENDENCE WITHOUT COMPLICATION: ICD-10-CM

## 2025-08-20 DIAGNOSIS — I10 ESSENTIAL HYPERTENSION: Primary | ICD-10-CM

## 2025-08-20 DIAGNOSIS — E55.9 VITAMIN D DEFICIENCY: ICD-10-CM

## 2025-08-20 DIAGNOSIS — Z23 ENCOUNTER FOR IMMUNIZATION: ICD-10-CM

## 2025-08-20 DIAGNOSIS — E53.8 VITAMIN B12 DEFICIENCY: ICD-10-CM

## 2025-08-20 DIAGNOSIS — K21.9 GASTROESOPHAGEAL REFLUX DISEASE WITHOUT ESOPHAGITIS: ICD-10-CM

## 2025-08-20 DIAGNOSIS — K58.0 IRRITABLE BOWEL SYNDROME WITH DIARRHEA: ICD-10-CM

## 2025-08-20 DIAGNOSIS — E78.2 MIXED HYPERLIPIDEMIA: ICD-10-CM

## 2025-08-20 DIAGNOSIS — E03.9 ADULT HYPOTHYROIDISM: ICD-10-CM

## 2025-08-20 DIAGNOSIS — K31.84 GASTROPARESIS: ICD-10-CM

## 2025-08-20 PROBLEM — M79.89 SWELLING OF BOTH HANDS: Status: RESOLVED | Noted: 2025-02-03 | Resolved: 2025-08-20

## 2025-08-20 PROBLEM — M79.89 BILATERAL SWELLING OF FEET: Status: RESOLVED | Noted: 2025-02-03 | Resolved: 2025-08-20

## 2025-08-20 PROCEDURE — 3008F BODY MASS INDEX DOCD: CPT | Performed by: FAMILY MEDICINE

## 2025-08-20 PROCEDURE — 3079F DIAST BP 80-89 MM HG: CPT | Performed by: FAMILY MEDICINE

## 2025-08-20 PROCEDURE — 3075F SYST BP GE 130 - 139MM HG: CPT | Performed by: FAMILY MEDICINE

## 2025-08-20 PROCEDURE — 99214 OFFICE O/P EST MOD 30 MIN: CPT | Performed by: FAMILY MEDICINE

## 2025-08-20 RX ORDER — LOSARTAN POTASSIUM 25 MG/1
25 TABLET ORAL DAILY
Qty: 90 TABLET | Refills: 1 | Status: SHIPPED | OUTPATIENT
Start: 2025-08-20 | End: 2026-02-16

## 2025-08-20 RX ORDER — HYOSCYAMINE SULFATE 0.12 MG/1
0.12 TABLET, ORALLY DISINTEGRATING ORAL 4 TIMES DAILY PRN
Qty: 360 TABLET | Refills: 1 | Status: SHIPPED | OUTPATIENT
Start: 2025-08-20 | End: 2026-02-16

## 2025-08-20 RX ORDER — LANOLIN ALCOHOL/MO/W.PET/CERES
1000 CREAM (GRAM) TOPICAL DAILY
Qty: 90 TABLET | Refills: 3 | Status: SHIPPED | OUTPATIENT
Start: 2025-08-20 | End: 2026-08-20

## 2025-08-20 RX ORDER — ACETAMINOPHEN 500 MG
50 TABLET ORAL DAILY
Qty: 90 CAPSULE | Refills: 3 | Status: SHIPPED | OUTPATIENT
Start: 2025-08-20 | End: 2026-08-20

## 2025-08-20 ASSESSMENT — ANXIETY QUESTIONNAIRES
7. FEELING AFRAID AS IF SOMETHING AWFUL MIGHT HAPPEN: MORE THAN HALF THE DAYS
GAD7 TOTAL SCORE: 16
5. BEING SO RESTLESS THAT IT IS HARD TO SIT STILL: MORE THAN HALF THE DAYS
IF YOU CHECKED OFF ANY PROBLEMS ON THIS QUESTIONNAIRE, HOW DIFFICULT HAVE THESE PROBLEMS MADE IT FOR YOU TO DO YOUR WORK, TAKE CARE OF THINGS AT HOME, OR GET ALONG WITH OTHER PEOPLE: VERY DIFFICULT
2. NOT BEING ABLE TO STOP OR CONTROL WORRYING: MORE THAN HALF THE DAYS
1. FEELING NERVOUS, ANXIOUS, OR ON EDGE: NEARLY EVERY DAY
6. BECOMING EASILY ANNOYED OR IRRITABLE: NEARLY EVERY DAY
4. TROUBLE RELAXING: SEVERAL DAYS
3. WORRYING TOO MUCH ABOUT DIFFERENT THINGS: NEARLY EVERY DAY

## 2025-08-20 ASSESSMENT — ENCOUNTER SYMPTOMS
ADENOPATHY: 0
SINUS PAIN: 0
SHORTNESS OF BREATH: 0
CHILLS: 0
DYSPHORIC MOOD: 0
FREQUENCY: 0
SINUS PRESSURE: 0
DIAPHORESIS: 0
ABDOMINAL PAIN: 0
FEVER: 0
POLYPHAGIA: 0
SORE THROAT: 0
DIARRHEA: 0
DYSURIA: 0
PALPITATIONS: 0
NERVOUS/ANXIOUS: 0
DIZZINESS: 0
CONSTIPATION: 0
CONFUSION: 0
VOMITING: 0
CHEST TIGHTNESS: 0
WHEEZING: 0
HEADACHES: 0
NAUSEA: 0
UNEXPECTED WEIGHT CHANGE: 0
COUGH: 0
HEMATURIA: 0
LIGHT-HEADEDNESS: 0
POLYDIPSIA: 0
NUMBNESS: 0

## 2025-08-20 ASSESSMENT — PATIENT HEALTH QUESTIONNAIRE - PHQ9
8. MOVING OR SPEAKING SO SLOWLY THAT OTHER PEOPLE COULD HAVE NOTICED. OR THE OPPOSITE, BEING SO FIGETY OR RESTLESS THAT YOU HAVE BEEN MOVING AROUND A LOT MORE THAN USUAL: SEVERAL DAYS
5. POOR APPETITE OR OVEREATING: NEARLY EVERY DAY
SUM OF ALL RESPONSES TO PHQ QUESTIONS 1-9: 19
2. FEELING DOWN, DEPRESSED OR HOPELESS: SEVERAL DAYS
4. FEELING TIRED OR HAVING LITTLE ENERGY: NEARLY EVERY DAY
1. LITTLE INTEREST OR PLEASURE IN DOING THINGS: NEARLY EVERY DAY
SUM OF ALL RESPONSES TO PHQ9 QUESTIONS 1 AND 2: 4
3. TROUBLE FALLING OR STAYING ASLEEP OR SLEEPING TOO MUCH: MORE THAN HALF THE DAYS
7. TROUBLE CONCENTRATING ON THINGS, SUCH AS READING THE NEWSPAPER OR WATCHING TELEVISION: NEARLY EVERY DAY
6. FEELING BAD ABOUT YOURSELF - OR THAT YOU ARE A FAILURE OR HAVE LET YOURSELF OR YOUR FAMILY DOWN: MORE THAN HALF THE DAYS
9. THOUGHTS THAT YOU WOULD BE BETTER OFF DEAD, OR OF HURTING YOURSELF: SEVERAL DAYS

## 2025-12-04 ENCOUNTER — APPOINTMENT (OUTPATIENT)
Dept: PRIMARY CARE | Facility: CLINIC | Age: 25
End: 2025-12-04
Payer: MEDICAID